# Patient Record
Sex: FEMALE | Race: BLACK OR AFRICAN AMERICAN | NOT HISPANIC OR LATINO | Employment: PART TIME | URBAN - METROPOLITAN AREA
[De-identification: names, ages, dates, MRNs, and addresses within clinical notes are randomized per-mention and may not be internally consistent; named-entity substitution may affect disease eponyms.]

---

## 2018-07-13 ENCOUNTER — HISTORICAL (OUTPATIENT)
Dept: ADMINISTRATIVE | Facility: HOSPITAL | Age: 28
End: 2018-07-13

## 2018-07-13 LAB
ABS NEUT (OLG): 2.65 X10(3)/MCL (ref 2.1–9.2)
APPEARANCE, UA: CLEAR
BACTERIA #/AREA URNS AUTO: ABNORMAL /[HPF]
BASOPHILS # BLD AUTO: 0.06 X10(3)/MCL
BASOPHILS NFR BLD AUTO: 1 %
BILIRUB UR QL STRIP: NEGATIVE
COLOR UR: ABNORMAL
EOSINOPHIL # BLD AUTO: 0.48 X10(3)/MCL
EOSINOPHIL NFR BLD AUTO: 8 %
ERYTHROCYTE [DISTWIDTH] IN BLOOD BY AUTOMATED COUNT: 12.4 % (ref 11.5–14.5)
GLUCOSE (UA): NORMAL
HCT VFR BLD AUTO: 39.2 % (ref 35–46)
HGB BLD-MCNC: 12.9 GM/DL (ref 12–16)
HGB UR QL STRIP: NEGATIVE
HYALINE CASTS #/AREA URNS LPF: ABNORMAL /[LPF]
IMM GRANULOCYTES # BLD AUTO: 0.01 10*3/UL
IMM GRANULOCYTES NFR BLD AUTO: 0 %
KETONES UR QL STRIP: NEGATIVE
LEUKOCYTE ESTERASE UR QL STRIP: NEGATIVE
LYMPHOCYTES # BLD AUTO: 2.73 X10(3)/MCL
LYMPHOCYTES NFR BLD AUTO: 43 % (ref 13–40)
MCH RBC QN AUTO: 31.5 PG (ref 26–34)
MCHC RBC AUTO-ENTMCNC: 32.9 GM/DL (ref 31–37)
MCV RBC AUTO: 95.6 FL (ref 80–100)
MONOCYTES # BLD AUTO: 0.41 X10(3)/MCL
MONOCYTES NFR BLD AUTO: 6 % (ref 4–12)
NEUTROPHILS # BLD AUTO: 2.65 X10(3)/MCL
NEUTROPHILS NFR BLD AUTO: 42 X10(3)/MCL
NITRITE UR QL STRIP: NEGATIVE
PH UR STRIP: 6 [PH] (ref 4.5–8)
PLATELET # BLD AUTO: 264 X10(3)/MCL (ref 130–400)
PMV BLD AUTO: 11.7 FL (ref 7.4–10.4)
PROT UR QL STRIP: 20 MG/DL
RBC # BLD AUTO: 4.1 X10(6)/MCL (ref 4–5.2)
RBC #/AREA URNS AUTO: ABNORMAL /[HPF]
SP GR UR STRIP: 1.02 (ref 1–1.03)
SQUAMOUS #/AREA URNS LPF: ABNORMAL /[LPF]
TSH SERPL-ACNC: 1.58 MIU/L (ref 0.36–3.74)
UROBILINOGEN UR STRIP-ACNC: NORMAL
WBC # SPEC AUTO: 6.3 X10(3)/MCL (ref 4.5–11)
WBC #/AREA URNS AUTO: ABNORMAL /HPF

## 2020-01-23 ENCOUNTER — HISTORICAL (OUTPATIENT)
Dept: ADMINISTRATIVE | Facility: HOSPITAL | Age: 30
End: 2020-01-23

## 2020-01-23 LAB
HAV IGM SERPL QL IA: NONREACTIVE
HBV CORE IGM SERPL QL IA: NONREACTIVE
HBV SURFACE AG SERPL QL IA: NEGATIVE
HCV AB SERPL QL IA: NONREACTIVE
HIV 1+2 AB+HIV1 P24 AG SERPL QL IA: NONREACTIVE
POC BETA-HCG (QUAL): NEGATIVE
T PALLIDUM AB SER QL: NONREACTIVE

## 2020-01-24 LAB
HIGH RISK HPV 16 (PRECISION): NEGATIVE
HIGH RISK HPV 18/45 (PRECISION): NEGATIVE
HPV16+18+H RISK 12 DNA CVX-IMP: POSITIVE
PAP RECOMMENDATION EXT: ABNORMAL
PAP SMEAR: ABNORMAL

## 2020-03-06 LAB — POC BETA-HCG (QUAL): NEGATIVE

## 2020-07-22 ENCOUNTER — HISTORICAL (OUTPATIENT)
Dept: INTERNAL MEDICINE | Facility: CLINIC | Age: 30
End: 2020-07-22

## 2020-07-22 LAB
ABS NEUT (OLG): 3.16 X10(3)/MCL (ref 2.1–9.2)
ALBUMIN SERPL-MCNC: 3.2 GM/DL (ref 3.4–5)
ALBUMIN/GLOB SERPL: 0.7 RATIO (ref 1.1–2)
ALP SERPL-CCNC: 73 UNIT/L (ref 45–117)
ALT SERPL-CCNC: 17 UNIT/L (ref 12–78)
APPEARANCE, UA: ABNORMAL
AST SERPL-CCNC: 11 UNIT/L (ref 15–37)
BACTERIA #/AREA URNS AUTO: ABNORMAL /HPF
BASOPHILS # BLD AUTO: 0 X10(3)/MCL (ref 0–0.2)
BASOPHILS NFR BLD AUTO: 1 %
BILIRUB SERPL-MCNC: 0.5 MG/DL (ref 0.2–1)
BILIRUB UR QL STRIP: NEGATIVE
BILIRUBIN DIRECT+TOT PNL SERPL-MCNC: 0.2 MG/DL (ref 0–0.2)
BILIRUBIN DIRECT+TOT PNL SERPL-MCNC: 0.3 MG/DL
BUN SERPL-MCNC: 13 MG/DL (ref 7–18)
CALCIUM SERPL-MCNC: 8.5 MG/DL (ref 8.5–10.1)
CHLORIDE SERPL-SCNC: 110 MMOL/L (ref 98–107)
CHOLEST SERPL-MCNC: 200 MG/DL
CHOLEST/HDLC SERPL: 3.8 {RATIO} (ref 0–4.4)
CO2 SERPL-SCNC: 27 MMOL/L (ref 21–32)
COLOR UR: ABNORMAL
CREAT SERPL-MCNC: 0.9 MG/DL (ref 0.6–1.3)
EOSINOPHIL # BLD AUTO: 0.2 X10(3)/MCL (ref 0–0.9)
EOSINOPHIL NFR BLD AUTO: 3 %
ERYTHROCYTE [DISTWIDTH] IN BLOOD BY AUTOMATED COUNT: 13.3 % (ref 11.5–14.5)
EST. AVERAGE GLUCOSE BLD GHB EST-MCNC: 97 MG/DL
GLOBULIN SER-MCNC: 4.4 GM/ML (ref 2.3–3.5)
GLUCOSE (UA): NEGATIVE
GLUCOSE SERPL-MCNC: 117 MG/DL (ref 74–106)
HBA1C MFR BLD: 5 % (ref 4.2–6.3)
HCT VFR BLD AUTO: 34.5 % (ref 35–46)
HDLC SERPL-MCNC: 52 MG/DL (ref 40–59)
HGB BLD-MCNC: 10.7 GM/DL (ref 12–16)
HGB UR QL STRIP: 1 MG/DL
HYALINE CASTS #/AREA URNS LPF: ABNORMAL /LPF
IMM GRANULOCYTES # BLD AUTO: 0.01 10*3/UL
IMM GRANULOCYTES NFR BLD AUTO: 0 %
KETONES UR QL STRIP: NEGATIVE
LDLC SERPL CALC-MCNC: 130 MG/DL
LEUKOCYTE ESTERASE UR QL STRIP: 25 LEU/UL
LYMPHOCYTES # BLD AUTO: 2.1 X10(3)/MCL (ref 0.6–4.6)
LYMPHOCYTES NFR BLD AUTO: 35 %
MCH RBC QN AUTO: 30.2 PG (ref 26–34)
MCHC RBC AUTO-ENTMCNC: 31 GM/DL (ref 31–37)
MCV RBC AUTO: 97.5 FL (ref 80–100)
MONOCYTES # BLD AUTO: 0.4 X10(3)/MCL (ref 0.1–1.3)
MONOCYTES NFR BLD AUTO: 6 %
NEUTROPHILS # BLD AUTO: 3.16 X10(3)/MCL (ref 2.1–9.2)
NEUTROPHILS NFR BLD AUTO: 54 %
NITRITE UR QL STRIP: NEGATIVE
PH UR STRIP: 6.5 [PH] (ref 4.5–8)
PLATELET # BLD AUTO: 253 X10(3)/MCL (ref 130–400)
PMV BLD AUTO: 11.5 FL (ref 7.4–10.4)
POTASSIUM SERPL-SCNC: 3.4 MMOL/L (ref 3.5–5.1)
PROT SERPL-MCNC: 7.6 GM/DL (ref 6.4–8.2)
PROT UR QL STRIP: 70 MG/DL
RBC # BLD AUTO: 3.54 X10(6)/MCL (ref 4–5.2)
RBC #/AREA URNS AUTO: ABNORMAL /HPF
SODIUM SERPL-SCNC: 142 MMOL/L (ref 136–145)
SP GR UR STRIP: 1.02 (ref 1–1.03)
SQUAMOUS #/AREA URNS LPF: >100 /LPF
T4 FREE SERPL-MCNC: 0.87 NG/DL (ref 0.76–1.46)
TRIGL SERPL-MCNC: 90 MG/DL
TSH SERPL-ACNC: 0.8 MIU/L (ref 0.36–3.74)
UROBILINOGEN UR STRIP-ACNC: NORMAL
VLDLC SERPL CALC-MCNC: 18 MG/DL
WBC # SPEC AUTO: 5.9 X10(3)/MCL (ref 4.5–11)
WBC #/AREA URNS AUTO: ABNORMAL /HPF

## 2020-07-24 LAB — FINAL CULTURE: NORMAL

## 2020-12-08 ENCOUNTER — HISTORICAL (OUTPATIENT)
Dept: INTERNAL MEDICINE | Facility: CLINIC | Age: 30
End: 2020-12-08

## 2020-12-08 LAB
ABS NEUT (OLG): 3.42 X10(3)/MCL (ref 2.1–9.2)
APPEARANCE, UA: CLEAR
BACTERIA #/AREA URNS AUTO: ABNORMAL /HPF
BASOPHILS # BLD AUTO: 0 X10(3)/MCL (ref 0–0.2)
BASOPHILS NFR BLD AUTO: 1 %
BILIRUB UR QL STRIP: NEGATIVE
BUN SERPL-MCNC: 13 MG/DL (ref 7–18.7)
CALCIUM SERPL-MCNC: 8.8 MG/DL (ref 8.4–10.2)
CHLORIDE SERPL-SCNC: 108 MMOL/L (ref 98–107)
CHOLEST SERPL-MCNC: 188 MG/DL
CHOLEST/HDLC SERPL: 4 {RATIO} (ref 0–5)
CO2 SERPL-SCNC: 26 MMOL/L (ref 22–29)
COLOR UR: YELLOW
CREAT SERPL-MCNC: 0.92 MG/DL (ref 0.55–1.02)
CREAT/UREA NIT SERPL: 14
EOSINOPHIL # BLD AUTO: 0.1 X10(3)/MCL (ref 0–0.9)
EOSINOPHIL NFR BLD AUTO: 2 %
ERYTHROCYTE [DISTWIDTH] IN BLOOD BY AUTOMATED COUNT: 13.6 % (ref 11.5–14.5)
GLUCOSE (UA): NEGATIVE
GLUCOSE SERPL-MCNC: 103 MG/DL (ref 74–100)
HCT VFR BLD AUTO: 36.1 % (ref 35–46)
HDLC SERPL-MCNC: 47 MG/DL (ref 35–60)
HGB BLD-MCNC: 11.1 GM/DL (ref 12–16)
HGB UR QL STRIP: NEGATIVE
HYALINE CASTS #/AREA URNS LPF: ABNORMAL /LPF
IMM GRANULOCYTES # BLD AUTO: 0.01 10*3/UL
IMM GRANULOCYTES NFR BLD AUTO: 0 %
KETONES UR QL STRIP: NEGATIVE
LDLC SERPL CALC-MCNC: 123 MG/DL (ref 50–140)
LEUKOCYTE ESTERASE UR QL STRIP: NEGATIVE
LYMPHOCYTES # BLD AUTO: 2.4 X10(3)/MCL (ref 0.6–4.6)
LYMPHOCYTES NFR BLD AUTO: 38 %
MCH RBC QN AUTO: 30.8 PG (ref 26–34)
MCHC RBC AUTO-ENTMCNC: 30.7 GM/DL (ref 31–37)
MCV RBC AUTO: 100.3 FL (ref 80–100)
MONOCYTES # BLD AUTO: 0.4 X10(3)/MCL (ref 0.1–1.3)
MONOCYTES NFR BLD AUTO: 6 %
NEUTROPHILS # BLD AUTO: 3.42 X10(3)/MCL (ref 2.1–9.2)
NEUTROPHILS NFR BLD AUTO: 54 %
NITRITE UR QL STRIP: NEGATIVE
PH UR STRIP: 5.5 [PH] (ref 4.5–8)
PLATELET # BLD AUTO: 292 X10(3)/MCL (ref 130–400)
PMV BLD AUTO: 11.2 FL (ref 7.4–10.4)
POTASSIUM SERPL-SCNC: 3.9 MMOL/L (ref 3.5–5.1)
PROT UR QL STRIP: 10 MG/DL
RBC # BLD AUTO: 3.6 X10(6)/MCL (ref 4–5.2)
RBC #/AREA URNS AUTO: ABNORMAL /HPF
SODIUM SERPL-SCNC: 140 MMOL/L (ref 136–145)
SP GR UR STRIP: 1.03 (ref 1–1.03)
SQUAMOUS #/AREA URNS LPF: ABNORMAL /LPF
TRIGL SERPL-MCNC: 89 MG/DL (ref 37–140)
UROBILINOGEN UR STRIP-ACNC: NORMAL
VLDLC SERPL CALC-MCNC: 18 MG/DL
WBC # SPEC AUTO: 6.4 X10(3)/MCL (ref 4.5–11)
WBC #/AREA URNS AUTO: ABNORMAL /HPF

## 2020-12-10 LAB — FINAL CULTURE: NORMAL

## 2022-04-10 ENCOUNTER — HISTORICAL (OUTPATIENT)
Dept: ADMINISTRATIVE | Facility: HOSPITAL | Age: 32
End: 2022-04-10

## 2022-04-25 VITALS
HEIGHT: 65 IN | OXYGEN SATURATION: 99 % | DIASTOLIC BLOOD PRESSURE: 84 MMHG | WEIGHT: 289 LBS | SYSTOLIC BLOOD PRESSURE: 136 MMHG | BODY MASS INDEX: 48.15 KG/M2

## 2022-05-03 NOTE — HISTORICAL [facility] CERNER
This is a historical note converted from [employer]ju[employer]. Formatting and pictures may have been removed.  Please reference Cerjuan for original formatting and attached multimedia. Chief Complaint  annual  History of Present Illness  The patient? here for annual exam.?Last GYN exam was 2 years ago in [place]. Her?LMP was 20. Period last?7-8 days and changes pads every 2-3 hours for cleanliness. Pt is requesting to restart Depo to become amenorrheic as she was in the past on Depo. Denies history of abnormal paps. Denies breast or urinary complaints.?Denies pelvic pain, abnormal bleeding or discharge today. Admits discharge and odor 2-3 months ago. Pt c/o external groin itching, admits to use of scented soaps, bath bombs and hot baths. Admits to razor shaving, with hair bumps occasionally. Pt reports chlamydia in the past and desires STI screening today. Contraception consist of condoms. Denies tobacco use. Denies fly hx of breast, ovarian, uterine or colon cancer.  ?  Review of Systems  CONSTITUTIONAL:??No weight loss, fever, chills or fatigue.  BREAST: No lumps or masses or pain, no nipple discharge or inversion  GI:??No nausea, vomiting or?abdominal pain.  :??No dysuria, frequency or urgency.?No hematuria.  GYN: No abnormal discharge, itching, bleeding, pelvic pain.  NEUROLOGIC:??Alert and oriented, No confusion.  Physical Exam  Vitals & Measurements  T:?36.8? ?C (Oral)? HR:?78(Peripheral)? RR:?20? BP:?136/88? SpO2:?99%?  HT:?175?cm? WT:?124.3?kg? BMI:?40.59? LMP:?2020 00:00 CST?  GENERAL: Alert and oriented x3.?No apparent distress.  BREAST: No mass, tenderness or discharge.?  ABDOMEN: Soft, nontender.??  PELVIC:?  Labia: No erythema or lesions or indurations.  Vagina: pink, moist vaginal mucosa, no abnormal discharge.  Cervix: Pink, no cervical motion tenderness.  Uterus: Mobile, non-tender, 10 weeks.  Adnexa: Non-tender, no palpable masses.  INTEGUMENTARY:?Warm and dry. Hyperpigmented eros  groin.  NEUROLOGIC: She is alert and oriented x3.?  PSYCHIATRIC:?Cooperative, appropriate mood and affect.  Assessment/Plan  1.?Pap smear for cervical cancer screening?Z12.4  ?Pap today.  RTC 1 year.  Ordered:  1160F- Medication reconciliation completed during visit, Pap smear for cervical cancer screening  Routine screening for STI (sexually transmitted infection)  Morbid obesity  Need for HPV vaccine, 01/23/20 14:16:00 CST  Clinic Follow up, *Est. 01/23/21 3:00:00 CST, Order for future visit, Pap smear for cervical cancer screening, [facility]  Pathology Gyn Request, 01/23/20 14:16:00 CST, AP Specimen, Thin Prep Pap Cervical-Auto/man screen, Routine GYN/Pap, Cervical, Thin Prep with HPV Probe, Normal, 01/23/20, Previous Pap, 2018, None, Previous Pregnancy, Cervix Present, Routine, Collected, RT - Routine, Hold Unt...  Preventative Health Care Est 18-39 years [id number] PC, Pap smear for cervical cancer screening, 01/23/20 14:16:00 CST  ?  2.?Routine screening for STI (sexually transmitted infection)?Z11.3  ?wet prep and g/c  HIV, hep and syphilis  ?  Change soap to Dove unscented only, no hot baths. Use electric clippers instead of razors. Keep area clean and dry.  Ordered:  1160F- Medication reconciliation completed during visit, Pap smear for cervical cancer screening  Routine screening for STI (sexually transmitted infection)  Morbid obesity  Need for HPV vaccine, 01/23/20 14:16:00 CST  Chlamydia trach and N. gonorrhea PCR, Routine collect, Cervical, Order for future visit, 01/23/20 14:16:00 CST, Stop date 01/23/20 14:16:00 CST, Nurse collect, Routine screening for STI (sexually transmitted infection)  Hepatitis Panel [facility], Routine collect, 01/23/20 14:16:00 CST, Blood, Order for future visit, Stop date 01/23/20 14:16:00 CST, Lab Collect, Routine screening for STI (sexually transmitted infection), 01/23/20 14:16:00 CST  HIV 1 and 2, Now collect, 01/23/20 14:16:00 CST, Blood, Order for future visit,  Stop date 01/23/20 14:16:00 CST, Lab Collect, Routine screening for STI (sexually transmitted infection), 01/23/20 14:16:00 CST  Syphilis Antibody (with Reflex RPR), Routine collect, 01/23/20 14:16:00 CST, Blood, Order for future visit, Stop date 01/23/20 14:16:00 CST, Lab Collect, Routine screening for STI (sexually transmitted infection), 01/23/20 14:16:00 CST  Wet Prep Smear, Routine collect, Vaginal, Order for future visit, 01/23/20 14:16:00 CST, Stop date 01/23/20 14:16:00 CST, Nurse collect, Routine screening for STI (sexually transmitted infection), 01/23/20 14:16:00 CST  ?  3.?Contraception management?Z30.9  Contraception counseling: Patient counseled on all risks/benefits/alternatives of contraceptive options including IUD, Implant, Injections, Combined Oral Contraceptive Pills, Vaginal ring and Patch, Progesterone only pills, barrier methods and abstinence.  Discussed progesterone only options to include Depo and Mirena IUD or nonhormonal Paragard IUD. In review of U.S. Medical Eligibility Criteria for Contraceptive Use, category?2 for history of controlled HTN, advantages outweigh risks, however pt had NSTEMI in 2019, followed by cardiology.  Pt interested in restarting Depo, last Depo 3+ years. States she accepts risks associated with Depo use, however she will speak with cardiology at visit on 2/14/20. If cardiology ok with proceeding with hormones will start Depo. If not, pt will need to proceed with Paragard IUD or tubal ligation which will not stop/decrease cycles. Pt verbalized understanding.  ?  4.?Morbid obesity?E66.01  ?Exercise 3-5 times a week for 30-45 minutes per day.  Avoid soda, simple sugars, excessive rice, potatoes or bread (carbs). Limit fast foods and fried foods. Choose complex carbs in moderation (example: green vegetables, beans, oatmeal). Eat plenty of fresh fruits and vegetables with lean meats daily. Including fish and shrimp.  Portion control.  Ordered:  1160F- Medication  reconciliation completed during visit, Pap smear for cervical cancer screening  Routine screening for STI (sexually transmitted infection)  Morbid obesity  Need for HPV vaccine, 20 14:16:00 CST  ?  Orders:  Urine Pregnancy POC, 20 14:16:00 CST  Referrals  Clinic Follow up, *Est. 21 3:00:00 CST, Order for future visit, Pap smear for cervical cancer screening, [facility]   Problem List/Past Medical History  Ongoing  Asthma  Essential hypertension  Hypertension  Morbid obesity  Historical  Pregnant  Pregnant  Pregnant  Pregnant  Procedure/Surgical History   delivery only;   Medications  albuterol 2.5 mg/3 mL (0.083%) inhalation solution, 2.5 mg= 3 mL, INH, q6hr, PRN, 6 refills  albuterol 90 mcg/inh inhalation aerosol, 2 puff(s), INH, q4hr, PRN, 2 refills  amoxicillin-clavulanate 875 mg-125 mg oral tablet, 1 tab(s), Oral, BID  aspirin 81 mg oral tablet, 81 mg= 1 tab(s), Oral, Daily  Flonase 50 mcg/inh nasal spray, 1 spray(s), Nasal, BID, 6 refills  generic, See Instructions  human papillomavirus vaccine 9-valent intramuscular suspension, 0.5 mL, IM, Once-Unscheduled  hydrALAZINE 100 mg oral tablet, 100 mg= 1 tab(s), Oral, TID, 6 refills  hydroCHLOROthiazide 12.5 mg oral capsule, 12.5 mg= 1 cap(s), Oral, Daily  losartan 100 mg oral tablet, See Instructions, 6 refills  Norvasc 10 mg oral tablet, 10 mg= 1 tab(s), Oral, Daily  Norvasc 10 mg oral tablet, 10 mg= 1 tab(s), Oral, Daily, 2 refills  sertraline 50 mg oral tablet, 75 mg= 1.5 tab(s), Oral, At Bedtime, 6 refills  Allergies  No Known Allergies  Social History  Abuse/Neglect  No, No, 2020  No, 2019  No, 2019  Alcohol - Denies Alcohol Use, 2015  Never, 2018  Employment/School  Employed, Work/School description: sitter., 2018  Exercise  Exercise frequency: 1-2 times/week. Self assessment: Good condition. Exercise type: Walking., 01/15/2019  Home/Environment  Lives with Children. Living situation:  Home/Independent., 07/13/2018  Nutrition/Health  Type of diet: low salt. Regular, 01/15/2019  Sexual  Sexually active: Yes. Number of current partners 1. Sexual orientation: Straight or heterosexual. Gender Identity Identifies as female. Yes, 01/23/2020  Sexually active: No., 09/25/2019  Spiritual/Cultural  Gnosticism, 09/25/2019  Substance Use - Denies Substance Abuse, 12/07/2015  Never, 02/14/2018  Tobacco - Denies Tobacco Use, 12/07/2015  Former smoker, quit more than 30 days ago, N/A, 01/23/2020  Never (less than 100 in lifetime), Cigarettes, N/A, 11/25/2019  Never (less than 100 in lifetime), N/A, 09/25/2019  Former smoker, quit more than 30 days ago, No, 06/21/2019  Former smoker, quit more than 30 days ago, N/A, Stopped age 25 Years. Previous treatment: None. Household tobacco concerns: No., 03/18/2019  Family History  Diabetes mellitus: Mother, Grandfather and Grandmother.  Hypertension.: Mother.  Immunizations  Vaccine Date Status   influenza virus vaccine, inactivated 03/18/2019 Given   Health Maintenance  Health Maintenance  ???Pending?(in the next year)  ??? ??OverDue  ??? ? ? ?Asthma Management-Asthma Medication Prescribed due??and every?  ??? ? ? ?Coronary Artery Disease Maintenance-Antiplatelet Agent Prescribed due??and every?  ??? ? ? ?Diabetes Screening due??and every?  ??? ??Due?  ??? ? ? ?Alcohol Misuse Screening due??01/01/20??and every 1??year(s)  ??? ? ? ?Coronary Artery Disease Maintenance-Lipid Lowering Therapy due??01/23/20??and every?  ??? ??Refused?  ??? ? ? ?Tetanus Vaccine due??01/23/20??and every 10??year(s)  ??? ??Due In Future?  ??? ? ? ?Hypertension Management-BMP not due until??03/20/20??and every 1??year(s)  ??? ? ? ?ADL Screening not due until??09/25/20??and every 1??year(s)  ??? ? ? ?Hypertension Management-Education not due until??09/25/20??and every 1??year(s)  ??? ? ? ?Obesity Screening not due until??01/01/21??and every 1??year(s)  ??? ? ? ?Blood Pressure Screening not due  until??01/22/21??and every 1??year(s)  ??? ? ? ?Body Mass Index Check not due until??01/22/21??and every 1??year(s)  ??? ? ? ?Hypertension Management-Blood Pressure not due until??01/22/21??and every 1??year(s)  ???Satisfied?(in the past 1 year)  ??? ??Satisfied?  ??? ? ? ?ADL Screening on??09/25/19.??Satisfied by Meghan Pichardo LPN  ??? ? ? ?Alcohol Misuse Screening on??09/25/19.??Satisfied by Coco Mcneill NP  ??? ? ? ?Asthma Management-Asthma Medication Prescribed on??09/25/19.??Satisfied by Coco Mcneill NP  ??? ? ? ?Blood Pressure Screening on??01/23/20.??Satisfied by Cherry Rocha LPN  ??? ? ? ?Body Mass Index Check on??01/23/20.??Satisfied by Cherry Rocha LPN  ??? ? ? ?Coronary Artery Disease Maintenance-Antiplatelet Agent Prescribed on??09/25/19.??Satisfied by Coco Mcneill NP  ??? ? ? ?Depression Screening on??11/25/19.??Satisfied by Katina Finley LPN  ??? ? ? ?Diabetes Maintenance-Fasting Lipid Profile on??03/18/19.??Satisfied by Yonny Wen  ??? ? ? ?Diabetes Maintenance-HgbA1c on??03/18/19.??Satisfied by Yonny Wen  ??? ? ? ?Diabetes Screening on??03/21/19.??Satisfied by Irasema Wei  ??? ? ? ?Hypertension Management-Blood Pressure on??01/23/20.??Satisfied by Cherry Rocha LPN  ??? ? ? ?Hypertension Management-Education on??09/25/19.??Satisfied by Coco Mcneill NP??Reason: Expectation Satisfied Elsewhere  ??? ? ? ?Hypertension Management-BMP on??03/21/19.??Satisfied by Irasema Wei  ??? ? ? ?Influenza Vaccine on??03/18/19.??Satisfied by Kiersten Apodaca RN  ??? ? ? ?Obesity Screening on??01/23/20.??Satisfied by Josefina Cherry DICK  ??? ??Refused?  ??? ? ? ?Tetanus Vaccine on??09/25/19.??Recorded by Osito SYED, Coco SANTANA??Reason: Patient Refuses  ?

## 2022-05-03 NOTE — HISTORICAL [facility] CERNER
This is a historical note converted from [employer]ner. Formatting and pictures may have been removed.  Please reference [employer]ner for original formatting and attached multimedia. Chief Complaint  F/U  History of Present Illness  28 y/o female here for initial visit. Pt has hx Asthma, HTN. Pt informed nurse she recently had an  last month due to her situation and is having anxiety/ depression. Denies SI/HI.  Review of Systems  Constitutional: negative except as stated in HPI  Eye: negative except as stated in HPI  ENT: negative except as stated in HPI  Respiratory: negative except as stated in HPI  Cardiovascular: negative except as stated in HPI  Gastrointestinal: negative except as stated in HPI  Genitourinary: negative except as stated in HPI  Heme/Lymph: negative except as stated in HPI  Endocrine: negative except as stated in HPI  Immunologic: negative except as stated in HPI  Musculoskeletal: negative except as stated in HPI  Integumentary: negative except as stated in HPI  Neurologic: negative except as stated in HPI  ?   All Other ROS_ negative except as stated in HPI  ?  Physical Exam  Vitals & Measurements  T:?37.0? ?C (Oral)? HR:?77(Peripheral)? RR:?18? BP:?152/100?  HT:?160.02?cm? HT:?160.02?cm? WT:?68.1?kg? WT:?68.1?kg? BMI:?26.59?  General: Alert and oriented, No acute distress.  Eye: Pupils are equal, round and reactive to light, Extraocular movements are intact.  HENT: Normocephalic.  Neck: Supple, Non-tender, No carotid bruit, No lymphadenopathy.  Respiratory: Lungs are clear to auscultation, Respirations are non-labored, Breath sounds are equal, Symmetrical chest wall expansion.  Cardiovascular: Normal rate, Regular rhythm, No murmur.  Gastrointestinal: Soft, Non-tender, Non-distended, Normal bowel sounds.  Musculoskeletal: Normal range of motion.  Integumentary: Warm, Dry, Intact.  Neurologic: No focal deficits, Cranial Nerves II-XII are grossly intact.  Assessment/Plan  Abnormal CBC  ?Labs in 3  months.  Ordered:  CBC w/ Auto Diff, Routine collect, 07/13/18 10:24:00 CDT, Blood, Order for future visit, Stop date 07/13/18 10:24:00 CDT, Lab Collect, Abnormal CBC, 07/13/18 10:24:00 CDT  Clinic Follow up, *Est. 10/13/18 3:00:00 CDT, in 3 months with YAIR Mcneill, Order for future visit, Asthma  HTN (hypertension)  Well adult exam  Abnormal CBC, [facility]  Office/Outpatient Visit Level 4 New [id number] PC, Asthma  HTN (hypertension)  Well adult exam  Abnormal CBC  Anxiety and depression, [facility], 07/13/18 10:45:00 CDT  ?  Anxiety and depression  ?Denies SI/HI. RX Sertraline 50 mg Take 1/2 tab po Q hs X 1 week then 1 tab po Q hs. RTC in 1 month for re-eval.  Ordered:  sertraline, See Instructions, Take 1/2 tab po Q hs X 1 week then 1 tab po Q hs., # 30 tab(s), 3 Refill(s), Pharmacy: [facility] [id number]  Clinic Follow up, *Est. 08/13/18 3:00:00 CDT, in 1 months with YAIR Mcneill f/u eval depression/anxiety., Order for future visit, Anxiety and depression, [facility]  Office/Outpatient Visit Level 4 New [id number] PC, Asthma  HTN (hypertension)  Well adult exam  Abnormal CBC  Anxiety and depression, [facility], 07/13/18 10:45:00 CDT  ?  Asthma  ?Avoid triggers. CXR today. PFT in 2 weeks. Cont med as prescribed.  Ordered:  budesonide, 1 puff(s), INH, BID, # 1 EA, 6 Refill(s), Pharmacy: [facility] [id number]  Clinic Follow up, *Est. 10/13/18 3:00:00 CDT, in 3 months with YAIR Mcneill, Order for future visit, Asthma  HTN (hypertension)  Well adult exam  Abnormal CBC, [facility]  Complete Pulmonary Function Test, *Est. 07/27/18 3:00:00 CDT, Order for future visit, Asthma, [facility]  Office/Outpatient Visit Level 4 New [id number] PC, Asthma  HTN (hypertension)  Well adult exam  Abnormal CBC  Anxiety and depression, [facility], 07/13/18 10:45:00 CDT  XR Chest 2 Views, Routine, *Est. 07/13/18 3:00:00 CDT, Asthma, None, Ambulatory, Rad Type, Order for future visit,  Asthma, Not Scheduled, *Est. 18 3:00:00 CDT  ?  HTN (hypertension)  ?Low fat low salt diet and exercise. Pt c/o LE swelling that is getting worse during the day. D/C Amlodipine. RS losartan 25 mg 1 tab po Q hs. Will repeat BP check in 1 month.  Ordered:  Clinic Follow up, *Est. 10/13/18 3:00:00 CDT, in 3 months with YAIR Mcneill, Order for future visit, Asthma  HTN (hypertension)  Well adult exam  Abnormal CBC, [facility]  Office/Outpatient Visit Level 4 New [id number] PC, Asthma  HTN (hypertension)  Well adult exam  Abnormal CBC  Anxiety and depression, [facility], 18 10:45:00 CDT  Urinalysis with Microscopic if Indicated, Routine collect, Urine, Order for future visit, 18 10:24:00 CDT, Stop date 18 10:24:00 CDT, Nurse collect, HTN (hypertension), 18 10:24:00 CDT  ?  Well adult exam  ?Labs in 3 months. Pt had pap smear done 18 at time of . Pt refused tetanus vaccine.  Ordered:  Clinic Follow up, *Est. 10/13/18 3:00:00 CDT, in 3 months with YAIR Mcneill, Order for future visit, Asthma  HTN (hypertension)  Well adult exam  Abnormal CBC, [facility]  Hemoglobin A1C [facility], Routine collect, *Est. 18 3:00:00 CDT, Blood, Order for future visit, *Est. Stop date 18 3:00:00 CDT, Lab Collect, Well adult exam, 18 10:24:00 CDT  Office/Outpatient Visit Level 4 New [id number] PC, Asthma  HTN (hypertension)  Well adult exam  Abnormal CBC  Anxiety and depression, [facility], 18 10:45:00 CDT  Thyroid Stimulating Hormone, Routine collect, 18 10:24:00 CDT, Blood, Order for future visit, Stop date 18 10:24:00 CDT, Lab Collect, Well adult exam, 18 10:24:00 CDT  ?  Orders:  albuterol, 2 puff(s), INH, q4hr, PRN PRN as needed for wheezing, # 8 gm, 2 Refill(s), Pharmacy: [facility] [id number]  albuterol, 2.5 mg = 3 mL, INH, q6hr, PRN PRN as needed for wheezing, # 100 EA, 6 Refill(s), [id number]  fluticasone nasal, 1  spray(s), Nasal, BID, # 16 gm, 6 Refill(s), [facility]acy: [id number]  losartan, 25 mg = 1 tab(s), Oral, Daily, # 30 tab(s), 11 Refill(s), Pharmacy: [id number]  montelukast, 10 mg = 1 tab(s), Oral, Daily, # 30 tab(s), 11 Refill(s), Pharmacy: [id number]  RTC in 1 month for re-eval of anxiety and depression and BP check.  RTC in 3 months with labs 1 week prior to appt.   Problem List/Past Medical History  Ongoing  Asthma  Diabetes  Essential hypertension  Hypertension  Historical  No qualifying data  Procedure/Surgical History   delivery only;.  Medications  albuterol 2.5 mg/3 mL (0.083%) inhalation solution, 2.5 mg= 3 mL, INH, q6hr, PRN, 6 refills  albuterol 90 mcg/inh inhalation aerosol, 2 puff(s), INH, q4hr, PRN, 2 refills  Flonase 50 mcg/inh nasal spray, 1 spray(s), Nasal, BID, 6 refills  losartan 25 mg oral tablet, 25 mg= 1 tab(s), Oral, Daily, 11 refills  MEDROXYPR AC INJ 150MG/ML  Pulmicort Flexhaler 180 mcg/inh inhalation powder, 1 puff(s), INH, BID, 6 refills  sertraline 50 mg oral tablet, See Instructions, 3 refills  Singulair 10 mg oral TABLET, 10 mg= 1 tab(s), Oral, Daily, 11 refills  Allergies  No Known Allergies  Social History  Alcohol - Denies Alcohol Use, 2015  Never, 2018  Employment/School  Employed, Work/School description: sitter., 2018  Home/Environment  Lives with Children. Living situation: Home/Independent., 2018  Substance Abuse - Denies Substance Abuse, 2015  Never, 2018  Tobacco - Denies Tobacco Use, 2015  Never smoker, 2018  Family History  Diabetes mellitus: Mother, Grandfather and Grandmother.  Hypertension.: Mother.  Health Maintenance  Health Maintenance  ???Pending?(in the next year)  ??? ??Due?  ??? ? ? ?Alcohol Misuse Screening due??18??and every 1??year(s)  ??? ? ? ?Cervical Cancer Screening due??18??Variable frequency  ??? ? ? ?Hypertension Management-Education  due??07/13/18??and every 1??year(s)  ??? ? ? ?Tetanus Vaccine due??07/13/18??and every 10??year(s)  ??? ??Due In Future?  ??? ? ? ?Influenza Vaccine not due until??10/02/18??and every 1??year(s)  ??? ? ? ?Hypertension Management-Blood Pressure not due until??10/24/18??and every 6??month(s)  ??? ? ? ?Smoking Cessation not due until??12/09/18??and every 1??year(s)  ??? ? ? ?Hypertension Management-BMP not due until??02/14/19??and every 1??year(s)  ??? ? ? ?Blood Pressure Screening not due until??04/24/19??and every 1??year(s)  ??? ? ? ?Body Mass Index Check not due until??04/24/19??and every 1??year(s)  ??? ? ? ?Depression Screening not due until??04/24/19??and every 1??year(s)  ??? ? ? ?Obesity Screening not due until??04/24/19??and every 1??year(s)  ??? ? ? ?Tobacco Use Screening not due until??04/24/19??and every 1??year(s)  ???Satisfied?(in the past 1 year)  ??? ??Satisfied?  ??? ? ? ?Blood Pressure Screening on??04/24/18.??Satisfied by Robby Mcadams LPN  ??? ? ? ?Body Mass Index Check on??04/24/18.??Satisfied by Robby Mcadams LPN  ??? ? ? ?Depression Screening on??04/24/18.??Satisfied by Robby Mcadams LPN  ??? ? ? ?Diabetes Screening on??02/14/18.??Satisfied by Ryann Fisher  ??? ? ? ?Hypertension Management-Blood Pressure on??04/24/18.??Satisfied by Robby Mcadams LPN  ??? ? ? ?Obesity Screening on??04/24/18.??Satisfied by Robby Mcadams LPN  ??? ? ? ?Smoking Cessation on??12/09/17.??Satisfied by Jarrod Martinez RN  ??? ? ? ?Tobacco Use Screening on??04/24/18.??Satisfied by Robby Mcadams LPN  ?  ?      Obesity: Encouraged low fat diet and exercise. Education provided.

## 2022-09-19 ENCOUNTER — HISTORICAL (OUTPATIENT)
Dept: ADMINISTRATIVE | Facility: HOSPITAL | Age: 32
End: 2022-09-19
Payer: MEDICAID

## 2023-03-04 ENCOUNTER — HOSPITAL ENCOUNTER (EMERGENCY)
Facility: HOSPITAL | Age: 33
Discharge: HOME | End: 2023-03-04
Attending: STUDENT IN AN ORGANIZED HEALTH CARE EDUCATION/TRAINING PROGRAM
Payer: MEDICAID

## 2023-03-04 VITALS
WEIGHT: 256.63 LBS | BODY MASS INDEX: 42.76 KG/M2 | TEMPERATURE: 98 F | SYSTOLIC BLOOD PRESSURE: 132 MMHG | RESPIRATION RATE: 19 BRPM | HEIGHT: 65 IN | HEART RATE: 75 BPM | DIASTOLIC BLOOD PRESSURE: 71 MMHG | OXYGEN SATURATION: 100 %

## 2023-03-04 DIAGNOSIS — K52.9 GASTROENTERITIS: Primary | ICD-10-CM

## 2023-03-04 DIAGNOSIS — B34.9 NONSPECIFIC SYNDROME SUGGESTIVE OF VIRAL ILLNESS: ICD-10-CM

## 2023-03-04 LAB
ALBUMIN SERPL-MCNC: 3.8 G/DL (ref 3.5–5)
ALBUMIN/GLOB SERPL: 0.9 RATIO (ref 1.1–2)
ALP SERPL-CCNC: 73 UNIT/L (ref 40–150)
ALT SERPL-CCNC: 13 UNIT/L (ref 0–55)
APPEARANCE UR: CLEAR
AST SERPL-CCNC: 17 UNIT/L (ref 5–34)
B-HCG UR QL: NEGATIVE
BACTERIA #/AREA URNS AUTO: ABNORMAL /HPF
BASOPHILS # BLD AUTO: 0.02 X10(3)/MCL (ref 0–0.2)
BASOPHILS NFR BLD AUTO: 0.2 %
BILIRUB UR QL STRIP.AUTO: NEGATIVE MG/DL
BILIRUBIN DIRECT+TOT PNL SERPL-MCNC: 1.2 MG/DL
BUN SERPL-MCNC: 10.8 MG/DL (ref 7–18.7)
CALCIUM SERPL-MCNC: 9.6 MG/DL (ref 8.4–10.2)
CHLORIDE SERPL-SCNC: 106 MMOL/L (ref 98–107)
CO2 SERPL-SCNC: 21 MMOL/L (ref 22–29)
COLOR UR AUTO: ABNORMAL
CREAT SERPL-MCNC: 0.91 MG/DL (ref 0.55–1.02)
CTP QC/QA: YES
EOSINOPHIL # BLD AUTO: 0.06 X10(3)/MCL (ref 0–0.9)
EOSINOPHIL NFR BLD AUTO: 0.5 %
ERYTHROCYTE [DISTWIDTH] IN BLOOD BY AUTOMATED COUNT: 13.6 % (ref 11.5–17)
FLUAV AG UPPER RESP QL IA.RAPID: NOT DETECTED
FLUBV AG UPPER RESP QL IA.RAPID: NOT DETECTED
GFR SERPLBLD CREATININE-BSD FMLA CKD-EPI: >60 MLS/MIN/1.73/M2
GLOBULIN SER-MCNC: 4.2 GM/DL (ref 2.4–3.5)
GLUCOSE SERPL-MCNC: 168 MG/DL (ref 74–100)
GLUCOSE UR QL STRIP.AUTO: NORMAL MG/DL
HCT VFR BLD AUTO: 38.4 % (ref 37–47)
HGB BLD-MCNC: 12.7 G/DL (ref 12–16)
HYALINE CASTS #/AREA URNS LPF: ABNORMAL /LPF
IMM GRANULOCYTES # BLD AUTO: 0.05 X10(3)/MCL (ref 0–0.04)
IMM GRANULOCYTES NFR BLD AUTO: 0.4 %
KETONES UR QL STRIP.AUTO: ABNORMAL MG/DL
LEUKOCYTE ESTERASE UR QL STRIP.AUTO: NEGATIVE UNIT/L
LIPASE SERPL-CCNC: 9 U/L
LYMPHOCYTES # BLD AUTO: 0.96 X10(3)/MCL (ref 0.6–4.6)
LYMPHOCYTES NFR BLD AUTO: 8.4 %
MCH RBC QN AUTO: 31 PG
MCHC RBC AUTO-ENTMCNC: 33.1 G/DL (ref 33–36)
MCV RBC AUTO: 93.7 FL (ref 80–94)
MONOCYTES # BLD AUTO: 0.27 X10(3)/MCL (ref 0.1–1.3)
MONOCYTES NFR BLD AUTO: 2.4 %
MUCOUS THREADS URNS QL MICRO: ABNORMAL /LPF
NEUTROPHILS # BLD AUTO: 10.09 X10(3)/MCL (ref 2.1–9.2)
NEUTROPHILS NFR BLD AUTO: 88.1 %
NITRITE UR QL STRIP.AUTO: NEGATIVE
NRBC BLD AUTO-RTO: 0 %
PH UR STRIP.AUTO: 8 [PH]
PLATELET # BLD AUTO: 307 X10(3)/MCL (ref 130–400)
PMV BLD AUTO: 11 FL (ref 7.4–10.4)
POTASSIUM SERPL-SCNC: 3.4 MMOL/L (ref 3.5–5.1)
PROT SERPL-MCNC: 8 GM/DL (ref 6.4–8.3)
PROT UR QL STRIP.AUTO: ABNORMAL MG/DL
RBC # BLD AUTO: 4.1 X10(6)/MCL (ref 4.2–5.4)
RBC #/AREA URNS AUTO: ABNORMAL /HPF
RBC UR QL AUTO: NEGATIVE UNIT/L
RSV A 5' UTR RNA NPH QL NAA+PROBE: NOT DETECTED
SARS-COV-2 RNA RESP QL NAA+PROBE: NOT DETECTED
SODIUM SERPL-SCNC: 140 MMOL/L (ref 136–145)
SP GR UR STRIP.AUTO: 1.02
SQUAMOUS #/AREA URNS LPF: ABNORMAL /HPF
UROBILINOGEN UR STRIP-ACNC: NORMAL MG/DL
WBC # SPEC AUTO: 11.5 X10(3)/MCL (ref 4.5–11.5)
WBC #/AREA URNS AUTO: ABNORMAL /HPF

## 2023-03-04 PROCEDURE — 85025 COMPLETE CBC W/AUTO DIFF WBC: CPT | Performed by: STUDENT IN AN ORGANIZED HEALTH CARE EDUCATION/TRAINING PROGRAM

## 2023-03-04 PROCEDURE — 96375 TX/PRO/DX INJ NEW DRUG ADDON: CPT

## 2023-03-04 PROCEDURE — 25000003 PHARM REV CODE 250: Performed by: STUDENT IN AN ORGANIZED HEALTH CARE EDUCATION/TRAINING PROGRAM

## 2023-03-04 PROCEDURE — 99284 EMERGENCY DEPT VISIT MOD MDM: CPT | Mod: 25

## 2023-03-04 PROCEDURE — 0241U COVID/RSV/FLU A&B PCR: CPT | Performed by: STUDENT IN AN ORGANIZED HEALTH CARE EDUCATION/TRAINING PROGRAM

## 2023-03-04 PROCEDURE — 96361 HYDRATE IV INFUSION ADD-ON: CPT

## 2023-03-04 PROCEDURE — 63600175 PHARM REV CODE 636 W HCPCS: Performed by: STUDENT IN AN ORGANIZED HEALTH CARE EDUCATION/TRAINING PROGRAM

## 2023-03-04 PROCEDURE — 81001 URINALYSIS AUTO W/SCOPE: CPT | Performed by: STUDENT IN AN ORGANIZED HEALTH CARE EDUCATION/TRAINING PROGRAM

## 2023-03-04 PROCEDURE — 96374 THER/PROPH/DIAG INJ IV PUSH: CPT

## 2023-03-04 PROCEDURE — 81025 URINE PREGNANCY TEST: CPT | Performed by: STUDENT IN AN ORGANIZED HEALTH CARE EDUCATION/TRAINING PROGRAM

## 2023-03-04 PROCEDURE — 80053 COMPREHEN METABOLIC PANEL: CPT | Performed by: STUDENT IN AN ORGANIZED HEALTH CARE EDUCATION/TRAINING PROGRAM

## 2023-03-04 PROCEDURE — 83690 ASSAY OF LIPASE: CPT | Performed by: STUDENT IN AN ORGANIZED HEALTH CARE EDUCATION/TRAINING PROGRAM

## 2023-03-04 RX ORDER — ACETAMINOPHEN 500 MG
1000 TABLET ORAL
Status: COMPLETED | OUTPATIENT
Start: 2023-03-04 | End: 2023-03-04

## 2023-03-04 RX ORDER — HYDROCHLOROTHIAZIDE 25 MG/1
25 TABLET ORAL
COMMUNITY
Start: 2023-02-01 | End: 2024-02-20

## 2023-03-04 RX ORDER — LOPERAMIDE HYDROCHLORIDE 2 MG/1
2 CAPSULE ORAL 4 TIMES DAILY PRN
Qty: 12 CAPSULE | Refills: 0 | Status: SHIPPED | OUTPATIENT
Start: 2023-03-04 | End: 2023-03-14

## 2023-03-04 RX ORDER — ALBUTEROL SULFATE 90 UG/1
2 AEROSOL, METERED RESPIRATORY (INHALATION) EVERY 6 HOURS PRN
COMMUNITY
Start: 2023-01-13 | End: 2024-01-10 | Stop reason: SDUPTHER

## 2023-03-04 RX ORDER — ONDANSETRON 2 MG/ML
4 INJECTION INTRAMUSCULAR; INTRAVENOUS
Status: COMPLETED | OUTPATIENT
Start: 2023-03-04 | End: 2023-03-04

## 2023-03-04 RX ORDER — LOSARTAN POTASSIUM 100 MG/1
100 TABLET ORAL
COMMUNITY
Start: 2023-01-13 | End: 2024-02-20 | Stop reason: SDUPTHER

## 2023-03-04 RX ORDER — KETOROLAC TROMETHAMINE 30 MG/ML
15 INJECTION, SOLUTION INTRAMUSCULAR; INTRAVENOUS
Status: COMPLETED | OUTPATIENT
Start: 2023-03-04 | End: 2023-03-04

## 2023-03-04 RX ORDER — SERTRALINE HYDROCHLORIDE 50 MG/1
50 TABLET, FILM COATED ORAL NIGHTLY
COMMUNITY
Start: 2023-01-13 | End: 2024-01-10 | Stop reason: SDUPTHER

## 2023-03-04 RX ORDER — ERGOCALCIFEROL 1.25 MG/1
CAPSULE ORAL
COMMUNITY
Start: 2023-01-13

## 2023-03-04 RX ORDER — FERROUS SULFATE TAB 325 MG (65 MG ELEMENTAL FE) 325 (65 FE) MG
TAB ORAL
COMMUNITY
Start: 2023-03-02

## 2023-03-04 RX ORDER — ATORVASTATIN CALCIUM 20 MG/1
20 TABLET, FILM COATED ORAL NIGHTLY
COMMUNITY
Start: 2022-09-28 | End: 2024-02-20 | Stop reason: SDUPTHER

## 2023-03-04 RX ORDER — ONDANSETRON 4 MG/1
4 TABLET, ORALLY DISINTEGRATING ORAL EVERY 6 HOURS PRN
Qty: 14 TABLET | Refills: 0 | Status: SHIPPED | OUTPATIENT
Start: 2023-03-04

## 2023-03-04 RX ORDER — CARVEDILOL 6.25 MG/1
6.25 TABLET ORAL 2 TIMES DAILY
COMMUNITY
Start: 2023-03-02 | End: 2024-01-10

## 2023-03-04 RX ORDER — HYDRALAZINE HYDROCHLORIDE 100 MG/1
100 TABLET, FILM COATED ORAL 3 TIMES DAILY
COMMUNITY
Start: 2022-09-28 | End: 2024-02-20 | Stop reason: SDUPTHER

## 2023-03-04 RX ORDER — AMLODIPINE BESYLATE 10 MG/1
10 TABLET ORAL
COMMUNITY
Start: 2023-01-13 | End: 2024-02-20 | Stop reason: SDUPTHER

## 2023-03-04 RX ADMIN — KETOROLAC TROMETHAMINE 15 MG: 30 INJECTION, SOLUTION INTRAMUSCULAR; INTRAVENOUS at 12:03

## 2023-03-04 RX ADMIN — SODIUM CHLORIDE 1000 ML: 9 INJECTION, SOLUTION INTRAVENOUS at 12:03

## 2023-03-04 RX ADMIN — ONDANSETRON 4 MG: 2 INJECTION INTRAMUSCULAR; INTRAVENOUS at 12:03

## 2023-03-04 RX ADMIN — ACETAMINOPHEN 1000 MG: 500 TABLET ORAL at 11:03

## 2023-03-04 NOTE — ED PROVIDER NOTES
Encounter Date: 3/4/2023       History     Chief Complaint   Patient presents with    Abdominal Pain    Vomiting    Diarrhea     C/o vomiting, diarrhea, abdominal pain since this am.      32-year-old female presents to ED for abdominal discomfort with nausea vomiting diarrhea for 1 day.  States the discomfort is generalized.  Not associated with eating.  Has had decreased appetite.  No flank pain, no changes in urination or stool.  Denies pregnancy.  Multiple questionable sick contacts.  Has no other complaints or concerns at this time.    Review of patient's allergies indicates:  No Known Allergies  Past Medical History:   Diagnosis Date    Maternal diabetes mellitus, antepartum 9/29/2016 3:22:36 PM    [facility] Historical - Other: Gestational diabetes mellitus (GDM) affecting pregnancy, antepartum-No Additional Notes    Maternal diabetes mellitus, antepartum 9/29/2016 3:22:36 PM    [facility] Historical - Other: Gestational diabetes mellitus (GDM) affecting pregnancy, antepartum-No Additional Notes     History reviewed. No pertinent surgical history.  History reviewed. No pertinent family history.  Social History     Tobacco Use    Smoking status: Never   Substance Use Topics    Alcohol use: Never    Drug use: Yes     Types: Marijuana     Comment: daily     Review of Systems   Constitutional:  Positive for chills and fever. Negative for diaphoresis.   HENT:  Negative for congestion, rhinorrhea, sinus pain and sore throat.    Eyes:  Negative for pain, discharge and itching.   Respiratory:  Negative for cough, chest tightness and shortness of breath.    Cardiovascular:  Negative for chest pain and palpitations.   Gastrointestinal:  Positive for abdominal pain, diarrhea, nausea and vomiting. Negative for abdominal distention, anal bleeding, blood in stool, constipation and rectal pain.   Genitourinary:  Negative for dysuria, flank pain and hematuria.   Musculoskeletal:  Negative for back pain and myalgias.    Skin:  Negative for color change and rash.   Neurological:  Negative for dizziness, weakness and headaches.   Psychiatric/Behavioral:  Negative for confusion. The patient is not hyperactive.      Physical Exam     Initial Vitals [03/04/23 1054]   BP Pulse Resp Temp SpO2   (!) 146/90 60 20 97.7 °F (36.5 °C) 100 %      MAP       --         Physical Exam    Vitals reviewed.  Constitutional: She appears well-developed and well-nourished. She is not diaphoretic. No distress.   Patient in no acute distress.  Just returned from the bathroom after vomiting.   HENT:   Head: Normocephalic and atraumatic.   Eyes: Conjunctivae and EOM are normal. Pupils are equal, round, and reactive to light.   Neck: Neck supple. No tracheal deviation present.   Normal range of motion.  Cardiovascular:  Normal rate, regular rhythm, normal heart sounds and intact distal pulses.           Pulmonary/Chest: Breath sounds normal. No respiratory distress.   Abdominal: Abdomen is soft. There is abdominal tenderness in the right upper quadrant and right lower quadrant.   Abdomen is mildly tender on the right side.  negative Solano's or McBurney's sign.   No right CVA tenderness.  No left CVA tenderness. There is no rebound, no guarding, no tenderness at McBurney's point and negative Solano's sign. negative Rovsing's sign  Musculoskeletal:         General: Normal range of motion.      Cervical back: Normal range of motion and neck supple.     Neurological: She is alert and oriented to person, place, and time. She has normal strength. GCS score is 15. GCS eye subscore is 4. GCS verbal subscore is 5. GCS motor subscore is 6.   Skin: Skin is warm and dry. Capillary refill takes less than 2 seconds. No rash noted.   Psychiatric: She has a normal mood and affect. Her behavior is normal. Judgment and thought content normal.       ED Course   Procedures  Labs Reviewed   COMPREHENSIVE METABOLIC PANEL - Abnormal; Notable for the following components:        Result Value    Potassium Level 3.4 (*)     Carbon Dioxide 21 (*)     Glucose Level 168 (*)     Globulin 4.2 (*)     Albumin/Globulin Ratio 0.9 (*)     All other components within normal limits   URINALYSIS, REFLEX TO URINE CULTURE - Abnormal; Notable for the following components:    Protein, UA Trace (*)     Ketones, UA 2+ (*)     Bacteria, UA Trace (*)     Squamous Epithelial Cells, UA Occ (*)     Mucous, UA Trace (*)     All other components within normal limits   CBC WITH DIFFERENTIAL - Abnormal; Notable for the following components:    RBC 4.10 (*)     MPV 11.0 (*)     Neut # 10.09 (*)     IG# 0.05 (*)     All other components within normal limits   LIPASE - Normal   COVID/RSV/FLU A&B PCR - Normal    Narrative:     The Xpert Xpress SARS-CoV-2/FLU/RSV plus is a rapid, multiplexed real-time PCR test intended for the simultaneous qualitative detection and differentiation of SARS-CoV-2, Influenza A, Influenza B, and respiratory syncytial virus (RSV) viral RNA in either nasopharyngeal swab or nasal swab specimens.         CBC W/ AUTO DIFFERENTIAL    Narrative:     The following orders were created for panel order CBC auto differential.  Procedure                               Abnormality         Status                     ---------                               -----------         ------                     CBC with Differential[[id number]]        Abnormal            Final result                 Please view results for these tests on the individual orders.   POCT URINE PREGNANCY          Imaging Results    None          Medications   sodium chloride 0.9% bolus 1,000 mL 1,000 mL ( Intravenous Restarted 3/4/23 1309)   ondansetron injection 4 mg (4 mg Intravenous Given 3/4/23 1200)   acetaminophen tablet 1,000 mg (1,000 mg Oral Given 3/4/23 1159)   ketorolac injection 15 mg (15 mg Intravenous Given 3/4/23 1208)     Medical Decision Making:   History:   Old Medical Records: I decided to obtain old medical  records.  Initial Assessment:   Abdominal discomfort with nausea vomiting diarrhea  Differential Diagnosis:   Gastroenteritis  Cholecystitis  Viral syndrome  COVID  Influenza  Food poisoning  Diverticulitis  SBO  Clinical Tests:   Lab Tests: Reviewed and Ordered  ED Management:  Patient initially had nausea and vomiting.  Abdominal exam demonstrated mild right-sided generalized abdominal discomfort without rebound guarding or rigidity.  IV initiated and provided fluids, antiemetics and pain medication after which patient states symptoms completely resolved.  States she felt great.  Pt sleeping comfortably in the room.  Abdomen nontender moving forward.  Laboratory analysis was grossly unremarkable with a negative viral panel, normal liver and pancreatic enzymes, noninfective urine, electrolytes grossly unremarkable.  Patient at this time can be treated symptomatically and supportively.  Will provide antiemetics and diarrheal medication and is to follow up closely in outpatient setting. Symptoms consistent clinically and through labs with viral syndrome.  Strict return precautions provided and patient discharged stable.  Voiced understanding. (Jemma)            ED Course as of 03/04/23 1800   Sat Mar 04, 2023   1147 Patient vomited in department.  Will start IV and give antiemetics with fluids. [RZ]      ED Course User Index  [RZ] Rico Easton MD                 Clinical Impression:   Final diagnoses:  [K52.9] Gastroenteritis (Primary)  [B34.9] Nonspecific syndrome suggestive of viral illness        ED Disposition Condition    Discharge Stable          ED Prescriptions       Medication Sig Dispense Start Date End Date Auth. Provider    ondansetron (ZOFRAN-ODT) 4 MG TbDL Take 1 tablet (4 mg total) by mouth every 6 (six) hours as needed (nausea). 14 tablet 3/4/2023 -- Rico Easton MD    loperamide (IMODIUM) 2 mg capsule Take 1 capsule (2 mg total) by mouth 4 (four) times daily as needed for Diarrhea. 12 capsule  3/4/2023 3/14/2023 Rico Easton MD          Follow-up Information       Follow up With Specialties Details Why Contact Info    Carmelakeith Moralez MD Internal Medicine Schedule an appointment as soon as possible for a visit in 3 days  [address] [place] [id number]  [phone]      [facility] Emergency Medicine  As needed, If symptoms worsen [address] [place] [address] [phone]             Rico Easton MD  03/04/23 1597

## 2023-03-04 NOTE — Clinical Note
"Elian"Sherry Mann was seen and treated in our emergency department on 3/4/2023.  She may return to work on 03/06/2023.       If you have any questions or concerns, please don't hesitate to call.      Rico Easton MD"

## 2023-04-10 ENCOUNTER — HOSPITAL ENCOUNTER (EMERGENCY)
Facility: HOSPITAL | Age: 33
Discharge: HOME | End: 2023-04-10
Attending: FAMILY MEDICINE
Payer: MEDICAID

## 2023-04-10 VITALS
SYSTOLIC BLOOD PRESSURE: 177 MMHG | DIASTOLIC BLOOD PRESSURE: 106 MMHG | WEIGHT: 251.31 LBS | RESPIRATION RATE: 16 BRPM | BODY MASS INDEX: 41.87 KG/M2 | TEMPERATURE: 99 F | OXYGEN SATURATION: 100 % | HEART RATE: 74 BPM | HEIGHT: 65 IN

## 2023-04-10 DIAGNOSIS — K02.9 PAIN DUE TO DENTAL CARIES: Primary | ICD-10-CM

## 2023-04-10 LAB
B-HCG UR QL: NEGATIVE
CTP QC/QA: YES

## 2023-04-10 PROCEDURE — 99284 EMERGENCY DEPT VISIT MOD MDM: CPT

## 2023-04-10 PROCEDURE — 81025 URINE PREGNANCY TEST: CPT | Performed by: PHYSICIAN ASSISTANT

## 2023-04-10 PROCEDURE — 96372 THER/PROPH/DIAG INJ SC/IM: CPT | Performed by: PHYSICIAN ASSISTANT

## 2023-04-10 PROCEDURE — 63600175 PHARM REV CODE 636 W HCPCS: Performed by: PHYSICIAN ASSISTANT

## 2023-04-10 RX ORDER — KETOROLAC TROMETHAMINE 10 MG/1
10 TABLET, FILM COATED ORAL EVERY 6 HOURS PRN
Qty: 20 TABLET | Refills: 0 | Status: SHIPPED | OUTPATIENT
Start: 2023-04-10 | End: 2023-04-15

## 2023-04-10 RX ORDER — AMOXICILLIN AND CLAVULANATE POTASSIUM 875; 125 MG/1; MG/1
1 TABLET, FILM COATED ORAL 2 TIMES DAILY
Qty: 14 TABLET | Refills: 0 | OUTPATIENT
Start: 2023-04-10 | End: 2023-12-29

## 2023-04-10 RX ORDER — CHLORHEXIDINE GLUCONATE ORAL RINSE 1.2 MG/ML
15 SOLUTION DENTAL 2 TIMES DAILY
Qty: 300 ML | Refills: 0 | Status: SHIPPED | OUTPATIENT
Start: 2023-04-10 | End: 2023-04-20

## 2023-04-10 RX ORDER — KETOROLAC TROMETHAMINE 30 MG/ML
30 INJECTION, SOLUTION INTRAMUSCULAR; INTRAVENOUS
Status: COMPLETED | OUTPATIENT
Start: 2023-04-10 | End: 2023-04-10

## 2023-04-10 RX ADMIN — KETOROLAC TROMETHAMINE 30 MG: 30 INJECTION, SOLUTION INTRAMUSCULAR; INTRAVENOUS at 11:04

## 2023-04-10 NOTE — DISCHARGE INSTRUCTIONS
It is important that you follow up with your dentist for further evaluation, workup, and treatment as necessary. The exam and treatment you received in Emergency Department was for an urgent problem and NOT INTENDED AS COMPLETE CARE. It is important that you FOLLOW UP with a doctor for ongoing care. If your symptoms become WORSE or you DO NOT IMPROVE and you are unable to reach your health care provider, you should RETURN to the Emergency Department.

## 2023-04-10 NOTE — ED PROVIDER NOTES
Encounter Date: 4/10/2023       History     Chief Complaint   Patient presents with    Dental Pain     Left lower dental pain x 2 days     Elian Mann is a 32 y.o. female with a history of HTN and asthma who presents to the ED complaining of dental pain. Pain has been present for the last 2 days after patient cracked her lower back molar. She has an appointment with her dentist in June, they could not get her scheduled any earlier. She did not take her BP meds today. She denies fevers, chills, sore throat, chest pain, SOB, headache. Did not take BP meds yet today.     The history is provided by the patient. No  was used.   Review of patient's allergies indicates:  No Known Allergies  Past Medical History:   Diagnosis Date    Asthma     Hypertension      History reviewed. No pertinent surgical history.  History reviewed. No pertinent family history.  Social History     Tobacco Use    Smoking status: Never    Smokeless tobacco: Never   Substance Use Topics    Alcohol use: Never    Drug use: Yes     Types: Marijuana     Comment: daily     Review of Systems   Constitutional:  Negative for chills and fever.   HENT:  Negative for congestion and sore throat.    Eyes:  Negative for redness and itching.   Respiratory:  Negative for cough and shortness of breath.    Cardiovascular:  Negative for chest pain and leg swelling.   Gastrointestinal:  Negative for abdominal pain and nausea.   Genitourinary:  Negative for dysuria and frequency.   Musculoskeletal:  Negative for arthralgias and back pain.   Skin:  Negative for color change and rash.   Neurological:  Negative for dizziness and weakness.   Hematological:  Does not bruise/bleed easily.     Physical Exam     Initial Vitals [04/10/23 1140]   BP Pulse Resp Temp SpO2   (!) 177/106 74 16 98.5 °F (36.9 °C) 100 %      MAP       --         Physical Exam    Nursing note and vitals reviewed.  Constitutional: She appears well-developed and well-nourished.  No distress.   HENT:   Head: Normocephalic and atraumatic.   Mouth/Throat: Dental caries present. No dental abscesses. No oropharyngeal exudate.       Eyes: EOM are normal. No scleral icterus.   Neck: Neck supple.   Normal range of motion.  Cardiovascular:  Normal rate and regular rhythm.           No murmur heard.  Pulmonary/Chest: Breath sounds normal. No respiratory distress. She has no wheezes.   Abdominal: Abdomen is soft. Bowel sounds are normal. She exhibits no distension. There is no abdominal tenderness.   Musculoskeletal:         General: No tenderness. Normal range of motion.      Cervical back: Normal range of motion and neck supple.     Neurological: She is alert and oriented to person, place, and time. No cranial nerve deficit.   Skin: Skin is warm and dry. Capillary refill takes less than 2 seconds. No erythema.   Psychiatric: She has a normal mood and affect. Her behavior is normal. Judgment and thought content normal.       ED Course   Procedures  Labs Reviewed   POCT URINE PREGNANCY          Imaging Results    None          Medications   ketorolac injection 30 mg (30 mg Intramuscular Given 4/10/23 1156)     Medical Decision Making:   Initial Assessment:   Resting comfortably in NAD. Hypertensive at 177/106, reports has not yet taken BP meds today.  Differential Diagnosis:   Dental pain, dental caries, tooth abscess  ED Management:  Provided patient with list of dentists so that she can try to find one who will see her earlier than her June appointment. Will cover with augmentin x 7 days due to suspicion for early infection/abscess formation. ED return precautions given. Pt verbalized understanding. All questions answered.                         Clinical Impression:   Final diagnoses:  [K02.9] Pain due to dental caries (Primary)        ED Disposition Condition    Discharge Stable          ED Prescriptions       Medication Sig Dispense Start Date End Date Auth. Provider    amoxicillin-clavulanate  875-125mg (AUGMENTIN) 875-125 mg per tablet Take 1 tablet by mouth 2 (two) times daily. 14 tablet 4/10/2023 -- Nury Meyer PA-C    chlorhexidine (PERIDEX) 0.12 % solution Use as directed 15 mLs in the mouth or throat 2 (two) times daily. for 10 days 300 mL 4/10/2023 4/20/2023 Nury Meyer PA-C    ketorolac (TORADOL) 10 mg tablet Take 1 tablet (10 mg total) by mouth every 6 (six) hours as needed for Pain. 20 tablet 4/10/2023 4/15/2023 Nury Meyer PA-C          Follow-up Information       Follow up With Specialties Details Why Contact Info    [facility] Emergency Medicine  If symptoms worsen [address] [facility] [address] [phone]    dentist  Schedule an appointment as soon as possible for a visit                Nury Meyer PA-C  04/10/23 4613

## 2023-12-29 ENCOUNTER — HOSPITAL ENCOUNTER (EMERGENCY)
Facility: HOSPITAL | Age: 33
Discharge: HOME | End: 2023-12-29
Attending: INTERNAL MEDICINE
Payer: MEDICAID

## 2023-12-29 VITALS
RESPIRATION RATE: 16 BRPM | SYSTOLIC BLOOD PRESSURE: 152 MMHG | OXYGEN SATURATION: 100 % | BODY MASS INDEX: 42.19 KG/M2 | DIASTOLIC BLOOD PRESSURE: 92 MMHG | HEART RATE: 70 BPM | TEMPERATURE: 98 F | WEIGHT: 253.5 LBS

## 2023-12-29 DIAGNOSIS — K08.89 PAIN, DENTAL: Primary | ICD-10-CM

## 2023-12-29 DIAGNOSIS — K04.7 DENTAL ABSCESS: ICD-10-CM

## 2023-12-29 DIAGNOSIS — K02.9 DENTAL CARIES: ICD-10-CM

## 2023-12-29 LAB
B-HCG UR QL: NEGATIVE
CTP QC/QA: YES

## 2023-12-29 PROCEDURE — 81025 URINE PREGNANCY TEST: CPT | Performed by: NURSE PRACTITIONER

## 2023-12-29 PROCEDURE — 99284 EMERGENCY DEPT VISIT MOD MDM: CPT

## 2023-12-29 RX ORDER — AMOXICILLIN 500 MG/1
500 CAPSULE ORAL 3 TIMES DAILY
Qty: 21 CAPSULE | Refills: 0 | Status: SHIPPED | OUTPATIENT
Start: 2023-12-29 | End: 2024-01-05

## 2023-12-29 RX ORDER — ACETAMINOPHEN AND CODEINE PHOSPHATE 300; 30 MG/1; MG/1
1 TABLET ORAL EVERY 6 HOURS PRN
Qty: 12 TABLET | Refills: 0 | Status: SHIPPED | OUTPATIENT
Start: 2023-12-29 | End: 2024-01-08

## 2023-12-29 NOTE — ED PROVIDER NOTES
Encounter Date: 12/29/2023       History     Chief Complaint   Patient presents with    Dental Pain    SCALP TENDERNESS     PT W CO RT SIDED SCALP TENDERNESS AND DENTAL PAIN X 2 DAYS.      The patient presents with dental pain. The onset was 2 days ago. The course/duration of symptoms is constant. Type of injury: none.  Location: left lower. The character of symptoms is pain and swelling. The degree of pain is minimal. The exacerbating factor is eating. The relieving factor is none.  Risk factors consist of none.  Prior episodes: occasional.  Therapy today: none.  Associated symptoms: none, denies fever, denies chills, denies nausea, denies vomiting, denies sore throat and denies cough.  Additional history: she also reports mild tenderness to right lateral scalp at and area she recently felt a small bump.         Review of patient's allergies indicates:  No Known Allergies  Past Medical History:   Diagnosis Date    Asthma     Hypertension      History reviewed. No pertinent surgical history.  History reviewed. No pertinent family history.  Social History     Tobacco Use    Smoking status: Never    Smokeless tobacco: Never   Substance Use Topics    Alcohol use: Never    Drug use: Yes     Types: Marijuana     Comment: daily     Review of Systems   Constitutional:  Negative for fever.   HENT:  Positive for dental problem. Negative for sore throat.    Respiratory:  Negative for shortness of breath.    Cardiovascular:  Negative for chest pain.   Gastrointestinal:  Negative for nausea.   Genitourinary:  Negative for dysuria.   Musculoskeletal:  Negative for back pain.   Skin:  Negative for rash.   Neurological:  Negative for weakness.   Hematological:  Does not bruise/bleed easily.   All other systems reviewed and are negative.      Physical Exam     Initial Vitals [12/29/23 0946]   BP Pulse Resp Temp SpO2   (!) 152/92 70 16 97.9 °F (36.6 °C) 100 %      MAP       --         Physical Exam    Nursing note and vitals  reviewed.  Constitutional: She appears well-developed and well-nourished.   HENT:   Head: Normocephalic and atraumatic.   Right Ear: Tympanic membrane normal.   Left Ear: Tympanic membrane normal.   Nose: Nose normal.   Mouth/Throat: Uvula is midline, oropharynx is clear and moist and mucous membranes are normal.   Mild left lower dental abscess with dental caries   Neck: Neck supple.   Normal range of motion.  Cardiovascular:  Normal rate, regular rhythm, normal heart sounds and intact distal pulses.           Pulmonary/Chest: Breath sounds normal.   Abdominal: Abdomen is soft. Bowel sounds are normal.   Musculoskeletal:         General: Normal range of motion.      Cervical back: Normal range of motion and neck supple.     Neurological: She is alert. She has normal strength.   Skin: Skin is warm and dry.   No abnormality appreciated on scalp at area of concern, no rash/erythema/or tenderness   Psychiatric: She has a normal mood and affect.         ED Course   Procedures  Labs Reviewed   POCT URINE PREGNANCY          Imaging Results    None          Medications - No data to display  Medical Decision Making  The patient presents with dental pain. The onset was 2 days ago. The course/duration of symptoms is constant. Type of injury: none.  Location: left lower. The character of symptoms is pain and swelling. The degree of pain is minimal. The exacerbating factor is eating. The relieving factor is none.  Risk factors consist of none.  Prior episodes: occasional.  Therapy today: none.  Associated symptoms: none, denies fever, denies chills, denies nausea, denies vomiting, denies sore throat and denies cough.  Additional history: she also reports mild tenderness to right lateral scalp at and area she recently felt a small bump.       10:02 AM DISPOSITION: The patient is resting comfortably in no acute distress.  She is hemodynamically stable and is without objective evidence for acute process requiring urgent  intervention or hospitalization. I provided counseling to patient with regard to condition, the treatment plan, specific conditions for return, and the importance of follow up. Detailed written and verbal instructions provided to patient and she expressed a verbal understanding of the discharge instructions and overall management plan. Reiterated the importance of medication administration and safety and advised patient to follow up with primary care provider in 3-5 days or sooner if needed.  Answered questions at this time. The patient is stable for discharge.       Amount and/or Complexity of Data Reviewed  Labs: ordered.      Additional MDM:   Differential Diagnosis:   Dental abscess, facial bones infections, soft tissue facial infections, osteomyelitis, facial bones fractures, among others                                     Clinical Impression:  Final diagnoses:  [K08.89] Pain, dental (Primary)  [K04.7] Dental abscess  [K02.9] Dental caries          ED Disposition Condition    Discharge Stable          ED Prescriptions       Medication Sig Dispense Start Date End Date Auth. Provider    amoxicillin (AMOXIL) 500 MG capsule Take 1 capsule (500 mg total) by mouth 3 (three) times daily. for 7 days 21 capsule 12/29/2023 1/5/2024 Yonathan Bergeron ACNP    acetaminophen-codeine 300-30mg (TYLENOL #3) 300-30 mg Tab Take 1 tablet by mouth every 6 (six) hours as needed. 12 tablet 12/29/2023 1/8/2024 Yonathan Bergeron ACNP          Follow-up Information       Follow up With Specialties Details Why Contact Info    follow up with dentist of choice in 2-3 days        [facility] Emergency Medicine  If symptoms worsen [address] [facility] [address] [phone]             Yonathan Bergeron ACNP  12/29/23 6844

## 2024-01-10 ENCOUNTER — OFFICE VISIT (OUTPATIENT)
Dept: INTERNAL MEDICINE | Facility: CLINIC | Age: 34
End: 2024-01-10
Payer: MEDICAID

## 2024-01-10 ENCOUNTER — HOSPITAL ENCOUNTER (OUTPATIENT)
Dept: RADIOLOGY | Facility: HOSPITAL | Age: 34
Discharge: HOME | End: 2024-01-10
Attending: NURSE PRACTITIONER
Payer: MEDICAID

## 2024-01-10 VITALS
RESPIRATION RATE: 18 BRPM | TEMPERATURE: 98 F | SYSTOLIC BLOOD PRESSURE: 122 MMHG | WEIGHT: 260.63 LBS | HEIGHT: 65 IN | HEART RATE: 64 BPM | BODY MASS INDEX: 43.42 KG/M2 | DIASTOLIC BLOOD PRESSURE: 76 MMHG

## 2024-01-10 DIAGNOSIS — E66.9 OBESITY, UNSPECIFIED CLASSIFICATION, UNSPECIFIED OBESITY TYPE, UNSPECIFIED WHETHER SERIOUS COMORBIDITY PRESENT: ICD-10-CM

## 2024-01-10 DIAGNOSIS — Z23 NEED FOR VACCINATION: Primary | ICD-10-CM

## 2024-01-10 DIAGNOSIS — R00.2 PALPITATIONS: ICD-10-CM

## 2024-01-10 DIAGNOSIS — J45.909 ASTHMA, UNSPECIFIED ASTHMA SEVERITY, UNSPECIFIED WHETHER COMPLICATED, UNSPECIFIED WHETHER PERSISTENT: ICD-10-CM

## 2024-01-10 DIAGNOSIS — Z00.00 WELL ADULT EXAM: ICD-10-CM

## 2024-01-10 DIAGNOSIS — I10 PRIMARY HYPERTENSION: ICD-10-CM

## 2024-01-10 DIAGNOSIS — I21.4 NSTEMI (NON-ST ELEVATED MYOCARDIAL INFARCTION): ICD-10-CM

## 2024-01-10 DIAGNOSIS — E78.5 HYPERLIPIDEMIA, UNSPECIFIED HYPERLIPIDEMIA TYPE: ICD-10-CM

## 2024-01-10 DIAGNOSIS — F41.9 ANXIETY: ICD-10-CM

## 2024-01-10 PROCEDURE — 3078F DIAST BP <80 MM HG: CPT | Mod: CPTII,,, | Performed by: NURSE PRACTITIONER

## 2024-01-10 PROCEDURE — 1160F RVW MEDS BY RX/DR IN RCRD: CPT | Mod: CPTII,,, | Performed by: NURSE PRACTITIONER

## 2024-01-10 PROCEDURE — 3008F BODY MASS INDEX DOCD: CPT | Mod: CPTII,,, | Performed by: NURSE PRACTITIONER

## 2024-01-10 PROCEDURE — 71046 X-RAY EXAM CHEST 2 VIEWS: CPT | Mod: TC

## 2024-01-10 PROCEDURE — 1159F MED LIST DOCD IN RCRD: CPT | Mod: CPTII,,, | Performed by: NURSE PRACTITIONER

## 2024-01-10 PROCEDURE — 99214 OFFICE O/P EST MOD 30 MIN: CPT | Mod: PBBFAC | Performed by: NURSE PRACTITIONER

## 2024-01-10 PROCEDURE — 3074F SYST BP LT 130 MM HG: CPT | Mod: CPTII,,, | Performed by: NURSE PRACTITIONER

## 2024-01-10 PROCEDURE — 90686 IIV4 VACC NO PRSV 0.5 ML IM: CPT | Mod: PBBFAC

## 2024-01-10 PROCEDURE — 90471 IMMUNIZATION ADMIN: CPT | Mod: PBBFAC

## 2024-01-10 PROCEDURE — 4010F ACE/ARB THERAPY RXD/TAKEN: CPT | Mod: CPTII,,, | Performed by: NURSE PRACTITIONER

## 2024-01-10 PROCEDURE — 99205 OFFICE O/P NEW HI 60 MIN: CPT | Mod: S$PBB,,, | Performed by: NURSE PRACTITIONER

## 2024-01-10 RX ORDER — ALBUTEROL SULFATE 90 UG/1
2 AEROSOL, METERED RESPIRATORY (INHALATION) EVERY 6 HOURS PRN
Qty: 18 G | Refills: 6 | Status: SHIPPED | OUTPATIENT
Start: 2024-01-10

## 2024-01-10 RX ORDER — SERTRALINE HYDROCHLORIDE 50 MG/1
TABLET, FILM COATED ORAL
Qty: 30 TABLET | Refills: 3 | Status: SHIPPED | OUTPATIENT
Start: 2024-01-10 | End: 2024-02-20 | Stop reason: SDUPTHER

## 2024-01-10 RX ORDER — FLUTICASONE PROPIONATE AND SALMETEROL XINAFOATE 230; 21 UG/1; UG/1
2 AEROSOL, METERED RESPIRATORY (INHALATION) 2 TIMES DAILY
Qty: 12 G | Refills: 3 | Status: SHIPPED | OUTPATIENT
Start: 2024-01-10 | End: 2024-02-20 | Stop reason: SDUPTHER

## 2024-01-10 RX ORDER — METOPROLOL SUCCINATE 50 MG/1
50 TABLET, EXTENDED RELEASE ORAL DAILY
Qty: 30 TABLET | Refills: 3 | Status: SHIPPED | OUTPATIENT
Start: 2024-01-10 | End: 2024-02-20 | Stop reason: SDUPTHER

## 2024-01-10 RX ORDER — CHLORTHALIDONE 50 MG/1
50 TABLET ORAL
COMMUNITY
Start: 2023-12-21 | End: 2024-02-20 | Stop reason: SDUPTHER

## 2024-01-10 RX ADMIN — INFLUENZA VIRUS VACCINE 0.5 ML: 15; 15; 15; 15 SUSPENSION INTRAMUSCULAR at 11:01

## 2024-01-10 NOTE — PROGRESS NOTES
Patient ID: [id number]     Chief Complaint: Establish Care (Not fasting, wants flu vaccine, refills )        HPI:     HPI      Elian Mann is a 33 y.o. female here today for a follow up.         Immunizations:   Immunization History   Administered Date(s) Administered    Influenza - Quadrivalent - PF *Preferred* (6 months and older) 01/10/2024        ----------------------------  Asthma  Hypertension     History reviewed. No pertinent surgical history.    Review of patient's allergies indicates:  No Known Allergies    Current Outpatient Medications   Medication Instructions    albuterol (PROVENTIL/VENTOLIN HFA) 90 mcg/actuation inhaler 2 puffs, Inhalation, Every 6 hours PRN    amLODIPine (NORVASC) 10 mg, Oral    atorvastatin (LIPITOR) 20 mg, Oral, Nightly    chlorthalidone (HYGROTEN) 50 mg, Oral    ergocalciferol (ERGOCALCIFEROL) 50,000 unit Cap Oral    FEROSUL 325 mg (65 mg iron) Tab tablet Oral    fluticasone-salmeterol 230-21 mcg/dose (ADVAIR HFA) 230-21 mcg/actuation HFAA inhaler 2 puffs, Inhalation, 2 times daily, Controller    hydrALAZINE (APRESOLINE) 100 mg, Oral, 3 times daily    hydroCHLOROthiazide (HYDRODIURIL) 25 mg, Oral    losartan (COZAAR) 100 mg, Oral    metoprolol succinate (TOPROL-XL) 50 mg, Oral, Daily    ondansetron (ZOFRAN-ODT) 4 mg, Oral, Every 6 hours PRN    sertraline (ZOLOFT) 50 MG tablet Take 1/2 tab po Q hs X 1 week then increase to 1 tab po Q hs.       Social History     Socioeconomic History    Marital status: Single   Tobacco Use    Smoking status: Never     Passive exposure: Never    Smokeless tobacco: Never   Substance and Sexual Activity    Alcohol use: Never    Drug use: Not Currently     Types: Marijuana     Comment: states quit 4-5 months     Social Determinants of Health     Transportation Needs: No Transportation Needs (1/10/2024)    PRAPARE - Transportation     Lack of Transportation (Medical): No     Lack of Transportation (Non-Medical): No   Housing Stability: Low Risk   "(1/10/2024)    Housing Stability Vital Sign     Unable to Pay for Housing in the Last Year: No     Number of Places Lived in the Last Year: 1     Unstable Housing in the Last Year: No        History reviewed. No pertinent family history.     Patient Care Team:  Coco Mcneill FNP as PCP - General (Family Medicine)     Subjective:     Review of Systems     See HPI for details    Constitutional: Denies Change in appetite. Denies Chills. Denies Fever. Denies Night sweats.  Eye: Denies Blurred vision. Denies Discharge. Denies Eye pain.  ENT: Denies Decreased hearing. Denies Sore throat. Denies Swollen glands.  Respiratory: Denies Cough. Denies Shortness of breath. Denies Shortness of breath with exertion. Denies Wheezing.  Cardiovascular: DeniesChest pain at rest. Denies Chest pain with exertion. Denies Irregular heartbeat. Denies Palpitations. Denies Edema.  Gastrointestinal: Denies Abdominal pain. DeniesDiarrhea. Denies Nausea. Denies Vomiting. Denies Hematemesis or Hematochezia.  Genitourinary: Denies Dysuria. Denies Urinary frequency. Denies Urinary urgency. Denies Blood in urine.  Endocrine: Denies Cold intolerance. Denies Excessive thirst. Denies Heat intolerance. Denies Weight loss. Denies Weight gain.  Musculoskeletal: Denies Painful joints. Denies Weakness.  Integumentary: Denies Rash. Denies Itching. Denies Dry skin.  Neurologic: Denies Dizziness. Denies Fainting. Denies Headache.  Psychiatric: Denies Depression. Denies Anxiety. Denies Suicidal/Homicidal ideations.    All Other ROS: Negative except as stated in HPI.       Objective:     Visit Vitals  /76 (BP Location: Right arm, Patient Position: Sitting, BP Method: Large (Automatic))   Pulse 64   Temp 98.4 °F (36.9 °C) (Oral)   Resp 18   Ht 5' 5" (1.651 m)   Wt 118.2 kg (260 lb 9.6 oz)   LMP 12/31/2023 (Exact Date)   BMI 43.37 kg/m²       Physical Exam    General: Alert and oriented, No acute distress.  Head: Normocephalic, Atraumatic.  Eye: Pupils " are equal, round and reactive to light, Extraocular movements are intact, Sclera non-icteric.  Ears/Nose/Throat: Normal, Mucosa moist,Clear.  Neck/Thyroid: Supple, Non-tender, No carotid bruit, No lymphadenopathy, No JVD, Full range of motion.  Respiratory: Clear to auscultation bilaterally; No wheezes, rales or rhonchi,Non-labored respirations, Symmetrical chest wall expansion.  Cardiovascular: Regular rate and rhythm, S1/S2 normal, No murmurs, rubs or gallops.  Gastrointestinal: Soft, Non-tender, Non-distended, Normal bowel sounds, No palpable organomegaly.  Musculoskeletal: Normal range of motion.  Integumentary: Warm, Dry, Intact, No suspicious lesions or rashes.  Extremities: No clubbing, cyanosis or edema  Neurologic: No focal deficits, Cranial Nerves II-XII are grossly intact, Motor strength normal upper and lower extremities, Sensory exam intact.  Psychiatric: Normal interaction, Coherent speech, Euthymic mood, Appropriate affect       Labs Reviewed:     Chemistry:  Lab Results   Component Value Date     03/04/2023     09/28/2021    K 3.4 (L) 03/04/2023    K 4.0 09/28/2021    CHLORIDE 106 03/04/2023    BUN 10.8 03/04/2023    BUN 13 09/28/2021    CREATININE 0.91 03/04/2023    CREATININE 1.31 (H) 09/28/2021    EGFRNORACEVR >60 03/04/2023    GLUCOSE 168 (H) 03/04/2023    CALCIUM 9.6 03/04/2023    CALCIUM 9.2 09/28/2021    ALKPHOS 73 03/04/2023    LABPROT 8.0 03/04/2023    ALBUMIN 3.8 03/04/2023    BILIDIR 0.2 07/22/2020    IBILI 0.3 07/22/2020    AST 17 03/04/2023    ALT 13 03/04/2023    MG 2.2 03/17/2019    PHOS 3.9 03/17/2019        Lab Results   Component Value Date    HGBA1C 5.0 07/22/2020        Hematology:  Lab Results   Component Value Date    WBC 11.5 03/04/2023    HGB 12.7 03/04/2023    HCT 38.4 03/04/2023     03/04/2023       Lipid Panel:  Lab Results   Component Value Date    CHOL 188 12/08/2020    HDL 47 12/08/2020    .00 12/08/2020    TRIG 89 12/08/2020    TOTALCHOLEST 4  12/08/2020        Urine:  Lab Results   Component Value Date    COLORUA Light-Yellow 03/04/2023    APPEARANCEUA Clear 03/04/2023    SGUA 1.021 03/04/2023    PHUA 8.0 03/04/2023    PROTEINUA Trace (A) 03/04/2023    GLUCOSEUA Normal 03/04/2023    KETONESUA 2+ (A) 03/04/2023    BLOODUA Negative 03/04/2023    NITRITESUA Negative 03/04/2023    LEUKOCYTESUR Negative 03/04/2023    RBCUA 0-5 03/04/2023    WBCUA 0-5 03/04/2023    BACTERIA Trace (A) 03/04/2023    SQEPUA Occ (A) 03/04/2023    HYALINECASTS None Seen 03/04/2023    CREATRANDUR 38.0 03/18/2019    PROTEINURINE <5.0 03/18/2019        Assessment:       ICD-10-CM ICD-9-CM   1. Need for vaccination  Z23 V05.9   2. Asthma, unspecified asthma severity, unspecified whether complicated, unspecified whether persistent  J45.909 493.90   3. Primary hypertension  I10 401.9   4. Palpitations  R00.2 785.1   5. Anxiety  F41.9 300.00   6. Well adult exam  Z00.00 V70.0   7. NSTEMI (non-ST elevated myocardial infarction)  I21.4 410.70   8. Hyperlipidemia, unspecified hyperlipidemia type  E78.5 272.4   9. Obesity, unspecified classification, unspecified obesity type, unspecified whether serious comorbidity present  E66.9 278.00        Plan:     1. Need for vaccination  Give flu vaccine today.   - influenza (QUADRIVALENT PF) vaccine 0.5 mL    2. Asthma, unspecified asthma severity, unspecified whether complicated, unspecified whether persistent  PFT in 1 month. CXR in 1 month. Pt currently on Carvedilol BID however is not cardio selective and could be triggering her asthma. D/C Carvediolol. Start Metoprolol succ 50 mg 1 tab po daily. Refilled Albuterol inhaler as prescribed prn SOB, Wheezing. Pt denies being on controller medication and winter season affects her the most. Will start on Advair -21 mg 2 puffs inhaled BID.     3. Primary hypertension  BP controlled. Low fat low salt diet and exercise. Cont     4. Palpitations  Holter monitor in 1 month. Echo in 1 month. Refer to  Cardio cl for eval and mgmt.     5. Anxiety  Denies SI/HI. Cont Sertraline as prescribed.     6. Well adult exam  Labs in 1 month.     7. NSTEMI (non-ST elevated myocardial infarction)  Pt has hx NSTEMI 11/2019. ER precautions given. Refer to Cardio cl for further eval and mgmt.    8. Hyperlipidemia, unspecified hyperlipidemia type  Low fat diet and exercise. Cont Atorvastatin as prescribed. Lipid in 1 month.    9. Obesity, unspecified classification, unspecified obesity type, unspecified whether serious comorbidity present  Encouraged low fat diet and exercise. Education provided.      10. Need for vaccination  Give flu vaccine today.     Follow up in about 1 month (around 2/10/2024) for with labs 1 week prior to appt, f/u holter and Echo.. In addition to their scheduled follow up, the patient has also been instructed to follow up on as needed basis.     No future appointments.     Coco Mcneill, RACHEL

## 2024-02-19 ENCOUNTER — LAB VISIT (OUTPATIENT)
Dept: LAB | Facility: HOSPITAL | Age: 34
End: 2024-02-19
Attending: NURSE PRACTITIONER
Payer: MEDICAID

## 2024-02-19 DIAGNOSIS — R00.2 PALPITATIONS: ICD-10-CM

## 2024-02-19 DIAGNOSIS — I10 PRIMARY HYPERTENSION: ICD-10-CM

## 2024-02-19 DIAGNOSIS — F41.9 ANXIETY: Primary | ICD-10-CM

## 2024-02-19 LAB
ALBUMIN SERPL-MCNC: 3.5 G/DL (ref 3.5–5)
ALBUMIN/GLOB SERPL: 0.9 RATIO (ref 1.1–2)
ALP SERPL-CCNC: 71 UNIT/L (ref 40–150)
ALT SERPL-CCNC: 17 UNIT/L (ref 0–55)
AST SERPL-CCNC: 20 UNIT/L (ref 5–34)
BASOPHILS # BLD AUTO: 0.05 X10(3)/MCL
BASOPHILS NFR BLD AUTO: 0.9 %
BILIRUB SERPL-MCNC: 0.8 MG/DL
BUN SERPL-MCNC: 19.8 MG/DL (ref 7–18.7)
CALCIUM SERPL-MCNC: 9.1 MG/DL (ref 8.4–10.2)
CHLORIDE SERPL-SCNC: 105 MMOL/L (ref 98–107)
CHOLEST SERPL-MCNC: 161 MG/DL
CHOLEST/HDLC SERPL: 3 {RATIO} (ref 0–5)
CO2 SERPL-SCNC: 28 MMOL/L (ref 22–29)
CREAT SERPL-MCNC: 0.86 MG/DL (ref 0.55–1.02)
EOSINOPHIL # BLD AUTO: 0.17 X10(3)/MCL (ref 0–0.9)
EOSINOPHIL NFR BLD AUTO: 3.1 %
ERYTHROCYTE [DISTWIDTH] IN BLOOD BY AUTOMATED COUNT: 12.6 % (ref 11.5–17)
GFR SERPLBLD CREATININE-BSD FMLA CKD-EPI: >60 MLS/MIN/1.73/M2
GLOBULIN SER-MCNC: 4.1 GM/DL (ref 2.4–3.5)
GLUCOSE SERPL-MCNC: 112 MG/DL (ref 74–100)
HCT VFR BLD AUTO: 35.6 % (ref 37–47)
HDLC SERPL-MCNC: 62 MG/DL (ref 35–60)
HGB BLD-MCNC: 11.7 G/DL (ref 12–16)
IMM GRANULOCYTES # BLD AUTO: 0.02 X10(3)/MCL (ref 0–0.04)
IMM GRANULOCYTES NFR BLD AUTO: 0.4 %
LDLC SERPL CALC-MCNC: 91 MG/DL (ref 50–140)
LYMPHOCYTES # BLD AUTO: 1.86 X10(3)/MCL (ref 0.6–4.6)
LYMPHOCYTES NFR BLD AUTO: 34.4 %
MCH RBC QN AUTO: 32.2 PG (ref 27–31)
MCHC RBC AUTO-ENTMCNC: 32.9 G/DL (ref 33–36)
MCV RBC AUTO: 98.1 FL (ref 80–94)
MONOCYTES # BLD AUTO: 0.46 X10(3)/MCL (ref 0.1–1.3)
MONOCYTES NFR BLD AUTO: 8.5 %
NEUTROPHILS # BLD AUTO: 2.84 X10(3)/MCL (ref 2.1–9.2)
NEUTROPHILS NFR BLD AUTO: 52.7 %
NRBC BLD AUTO-RTO: 0 %
PLATELET # BLD AUTO: 262 X10(3)/MCL (ref 130–400)
PMV BLD AUTO: 11.1 FL (ref 7.4–10.4)
POTASSIUM SERPL-SCNC: 3.3 MMOL/L (ref 3.5–5.1)
PROT SERPL-MCNC: 7.6 GM/DL (ref 6.4–8.3)
RBC # BLD AUTO: 3.63 X10(6)/MCL (ref 4.2–5.4)
SODIUM SERPL-SCNC: 141 MMOL/L (ref 136–145)
TRIGL SERPL-MCNC: 42 MG/DL (ref 37–140)
VLDLC SERPL CALC-MCNC: 8 MG/DL
WBC # SPEC AUTO: 5.4 X10(3)/MCL (ref 4.5–11.5)

## 2024-02-19 PROCEDURE — 80061 LIPID PANEL: CPT

## 2024-02-19 PROCEDURE — 85025 COMPLETE CBC W/AUTO DIFF WBC: CPT

## 2024-02-19 PROCEDURE — 80053 COMPREHEN METABOLIC PANEL: CPT

## 2024-02-19 PROCEDURE — 36415 COLL VENOUS BLD VENIPUNCTURE: CPT

## 2024-02-20 ENCOUNTER — OFFICE VISIT (OUTPATIENT)
Dept: INTERNAL MEDICINE | Facility: CLINIC | Age: 34
End: 2024-02-20
Payer: MEDICAID

## 2024-02-20 VITALS
DIASTOLIC BLOOD PRESSURE: 79 MMHG | HEIGHT: 65 IN | BODY MASS INDEX: 43.37 KG/M2 | HEART RATE: 75 BPM | RESPIRATION RATE: 18 BRPM | TEMPERATURE: 98 F | SYSTOLIC BLOOD PRESSURE: 125 MMHG

## 2024-02-20 DIAGNOSIS — E87.6 HYPOKALEMIA: ICD-10-CM

## 2024-02-20 DIAGNOSIS — J45.909 ASTHMA, UNSPECIFIED ASTHMA SEVERITY, UNSPECIFIED WHETHER COMPLICATED, UNSPECIFIED WHETHER PERSISTENT: ICD-10-CM

## 2024-02-20 DIAGNOSIS — E66.9 OBESITY, UNSPECIFIED CLASSIFICATION, UNSPECIFIED OBESITY TYPE, UNSPECIFIED WHETHER SERIOUS COMORBIDITY PRESENT: ICD-10-CM

## 2024-02-20 DIAGNOSIS — Z00.00 WELL ADULT EXAM: ICD-10-CM

## 2024-02-20 DIAGNOSIS — E78.2 MIXED HYPERLIPIDEMIA: ICD-10-CM

## 2024-02-20 DIAGNOSIS — R79.89 ABNORMAL CBC: ICD-10-CM

## 2024-02-20 DIAGNOSIS — F41.9 ANXIETY: ICD-10-CM

## 2024-02-20 DIAGNOSIS — I21.4 NSTEMI (NON-ST ELEVATED MYOCARDIAL INFARCTION): ICD-10-CM

## 2024-02-20 DIAGNOSIS — R00.2 PALPITATIONS: ICD-10-CM

## 2024-02-20 DIAGNOSIS — I10 PRIMARY HYPERTENSION: Primary | ICD-10-CM

## 2024-02-20 PROCEDURE — 1159F MED LIST DOCD IN RCRD: CPT | Mod: CPTII,,, | Performed by: NURSE PRACTITIONER

## 2024-02-20 PROCEDURE — 99214 OFFICE O/P EST MOD 30 MIN: CPT | Mod: S$PBB,,, | Performed by: NURSE PRACTITIONER

## 2024-02-20 PROCEDURE — 3008F BODY MASS INDEX DOCD: CPT | Mod: CPTII,,, | Performed by: NURSE PRACTITIONER

## 2024-02-20 PROCEDURE — 3078F DIAST BP <80 MM HG: CPT | Mod: CPTII,,, | Performed by: NURSE PRACTITIONER

## 2024-02-20 PROCEDURE — 4010F ACE/ARB THERAPY RXD/TAKEN: CPT | Mod: CPTII,,, | Performed by: NURSE PRACTITIONER

## 2024-02-20 PROCEDURE — 3074F SYST BP LT 130 MM HG: CPT | Mod: CPTII,,, | Performed by: NURSE PRACTITIONER

## 2024-02-20 PROCEDURE — 1160F RVW MEDS BY RX/DR IN RCRD: CPT | Mod: CPTII,,, | Performed by: NURSE PRACTITIONER

## 2024-02-20 PROCEDURE — 99214 OFFICE O/P EST MOD 30 MIN: CPT | Mod: PBBFAC | Performed by: NURSE PRACTITIONER

## 2024-02-20 RX ORDER — ATORVASTATIN CALCIUM 20 MG/1
20 TABLET, FILM COATED ORAL NIGHTLY
Qty: 90 TABLET | Refills: 1 | Status: SHIPPED | OUTPATIENT
Start: 2024-02-20 | End: 2024-05-21 | Stop reason: SDUPTHER

## 2024-02-20 RX ORDER — CHLORTHALIDONE 50 MG/1
50 TABLET ORAL DAILY
Qty: 90 TABLET | Refills: 1 | Status: SHIPPED | OUTPATIENT
Start: 2024-02-20 | End: 2024-05-21 | Stop reason: SDUPTHER

## 2024-02-20 RX ORDER — SERTRALINE HYDROCHLORIDE 50 MG/1
TABLET, FILM COATED ORAL
Qty: 90 TABLET | Refills: 1 | Status: SHIPPED | OUTPATIENT
Start: 2024-02-20 | End: 2024-05-21 | Stop reason: SDUPTHER

## 2024-02-20 RX ORDER — AMLODIPINE BESYLATE 10 MG/1
10 TABLET ORAL DAILY
Qty: 90 TABLET | Refills: 1 | Status: SHIPPED | OUTPATIENT
Start: 2024-02-20 | End: 2024-05-21 | Stop reason: SDUPTHER

## 2024-02-20 RX ORDER — METOPROLOL SUCCINATE 50 MG/1
50 TABLET, EXTENDED RELEASE ORAL DAILY
Qty: 90 TABLET | Refills: 1 | Status: SHIPPED | OUTPATIENT
Start: 2024-02-20 | End: 2024-05-21 | Stop reason: SDUPTHER

## 2024-02-20 RX ORDER — HYDRALAZINE HYDROCHLORIDE 100 MG/1
100 TABLET, FILM COATED ORAL 3 TIMES DAILY
Qty: 180 TABLET | Refills: 1 | Status: SHIPPED | OUTPATIENT
Start: 2024-02-20 | End: 2024-05-21 | Stop reason: SDUPTHER

## 2024-02-20 RX ORDER — LOSARTAN POTASSIUM 100 MG/1
100 TABLET ORAL DAILY
Qty: 90 TABLET | Refills: 1 | Status: SHIPPED | OUTPATIENT
Start: 2024-02-20 | End: 2024-05-21 | Stop reason: SDUPTHER

## 2024-02-20 RX ORDER — FLUTICASONE PROPIONATE AND SALMETEROL XINAFOATE 230; 21 UG/1; UG/1
2 AEROSOL, METERED RESPIRATORY (INHALATION) 2 TIMES DAILY
Qty: 12 G | Refills: 3 | Status: SHIPPED | OUTPATIENT
Start: 2024-02-20 | End: 2024-05-21 | Stop reason: SDUPTHER

## 2024-02-20 NOTE — PROGRESS NOTES
Patient ID: [id number]     Chief Complaint: lab results        HPI:     JASON Mann is a 33 y.o. female here today for a follow up.         Optometrist:  Dentist:  Breast Cancer Screening:  [unfilled]   Cervical Cancer Screening:     Colorectal Cancer Screening:  Diabetic Eye Exam:  Diabetic Foot Exam:  Lung Cancer Screening:    Prostate Cancer Screening:  AAA Screening:  Osteoporosis Screening:  Medicare Wellness:   Immunizations:   Immunization History   Administered Date(s) Administered    Influenza - Quadrivalent - PF *Preferred* (6 months and older) 01/10/2024        ----------------------------  Asthma  Hypertension     History reviewed. No pertinent surgical history.    Review of patient's allergies indicates:  No Known Allergies    Current Outpatient Medications   Medication Instructions    albuterol (PROVENTIL/VENTOLIN HFA) 90 mcg/actuation inhaler 2 puffs, Inhalation, Every 6 hours PRN    amLODIPine (NORVASC) 10 mg, Oral, Daily    atorvastatin (LIPITOR) 20 mg, Oral, Nightly    chlorthalidone (HYGROTEN) 50 mg, Oral, Daily    ergocalciferol (ERGOCALCIFEROL) 50,000 unit Cap Oral    FEROSUL 325 mg (65 mg iron) Tab tablet Oral    fluticasone-salmeterol 230-21 mcg/dose (ADVAIR HFA) 230-21 mcg/actuation HFAA inhaler 2 puffs, Inhalation, 2 times daily, Controller    hydrALAZINE (APRESOLINE) 100 mg, Oral, 3 times daily    losartan (COZAAR) 100 mg, Oral, Daily    metoprolol succinate (TOPROL-XL) 50 mg, Oral, Daily    ondansetron (ZOFRAN-ODT) 4 mg, Oral, Every 6 hours PRN    sertraline (ZOLOFT) 50 MG tablet Take 1/2 tab po Q hs X 1 week then increase to 1 tab po Q hs.       Social History     Socioeconomic History    Marital status: Single   Tobacco Use    Smoking status: Never     Passive exposure: Never    Smokeless tobacco: Never   Substance and Sexual Activity    Alcohol use: Never    Drug use: Not Currently     Types: Marijuana     Comment: states quit 4-5 months     Social Determinants of  Health     Transportation Needs: No Transportation Needs (1/10/2024)    PRAPARE - Transportation     Lack of Transportation (Medical): No     Lack of Transportation (Non-Medical): No   Housing Stability: Low Risk  (1/10/2024)    Housing Stability Vital Sign     Unable to Pay for Housing in the Last Year: No     Number of Places Lived in the Last Year: 1     Unstable Housing in the Last Year: No        History reviewed. No pertinent family history.     Patient Care Team:  Coco Mcneill FNP as PCP - General (Family Medicine)     Subjective:     Review of Systems     See HPI for details    Constitutional: Denies Change in appetite. Denies Chills. Denies Fever. Denies Night sweats.  Eye: Denies Blurred vision. Denies Discharge. Denies Eye pain.  ENT: Denies Decreased hearing. Denies Sore throat. Denies Swollen glands.  Respiratory: Denies Cough. Denies Shortness of breath. Denies Shortness of breath with exertion. Denies Wheezing.  Cardiovascular: DeniesChest pain at rest. Denies Chest pain with exertion. Denies Irregular heartbeat. Denies Palpitations. Denies Edema.  Gastrointestinal: Denies Abdominal pain. DeniesDiarrhea. Denies Nausea. Denies Vomiting. Denies Hematemesis or Hematochezia.  Genitourinary: Denies Dysuria. Denies Urinary frequency. Denies Urinary urgency. Denies Blood in urine.  Endocrine: Denies Cold intolerance. Denies Excessive thirst. Denies Heat intolerance. Denies Weight loss. Denies Weight gain.  Musculoskeletal: Denies Painful joints. Denies Weakness.  Integumentary: Denies Rash. Denies Itching. Denies Dry skin.  Neurologic: Denies Dizziness. Denies Fainting. Denies Headache.  Psychiatric: Denies Depression. Denies Anxiety. Denies Suicidal/Homicidal ideations.    All Other ROS: Negative except as stated in HPI.       Objective:     Visit Vitals  /79 (BP Location: Right arm, Patient Position: Sitting, BP Method: Large (Automatic))   Pulse 75   Temp 98.3 °F (36.8 °C) (Oral)   Resp 18  "  Ht 5' 5" (1.651 m)   BMI 43.37 kg/m²       Physical Exam    General: Alert and oriented, No acute distress.  Head: Normocephalic, Atraumatic.  Eye: Pupils are equal, round and reactive to light, Extraocular movements are intact, Sclera non-icteric.  Ears/Nose/Throat: Normal, Mucosa moist,Clear.  Neck/Thyroid: Supple, Non-tender, No carotid bruit, No lymphadenopathy, No JVD, Full range of motion.  Respiratory: Clear to auscultation bilaterally; No wheezes, rales or rhonchi,Non-labored respirations, Symmetrical chest wall expansion.  Cardiovascular: Regular rate and rhythm, S1/S2 normal, No murmurs, rubs or gallops.  Gastrointestinal: Soft, Non-tender, Non-distended, Normal bowel sounds, No palpable organomegaly.  Musculoskeletal: Normal range of motion.  Integumentary: Warm, Dry, Intact, No suspicious lesions or rashes.  Extremities: No clubbing, cyanosis or edema  Neurologic: No focal deficits, Cranial Nerves II-XII are grossly intact, Motor strength normal upper and lower extremities, Sensory exam intact.  Psychiatric: Normal interaction, Coherent speech, Euthymic mood, Appropriate affect       Labs Reviewed:     Chemistry:  Lab Results   Component Value Date     02/19/2024     09/28/2021    K 3.3 (L) 02/19/2024    K 4.0 09/28/2021    CHLORIDE 105 02/19/2024    BUN 19.8 (H) 02/19/2024    BUN 13 09/28/2021    CREATININE 0.86 02/19/2024    CREATININE 1.31 (H) 09/28/2021    EGFRNORACEVR >60 02/19/2024    GLUCOSE 112 (H) 02/19/2024    CALCIUM 9.1 02/19/2024    CALCIUM 9.2 09/28/2021    ALKPHOS 71 02/19/2024    LABPROT 7.6 02/19/2024    ALBUMIN 3.5 02/19/2024    BILIDIR 0.2 07/22/2020    IBILI 0.3 07/22/2020    AST 20 02/19/2024    ALT 17 02/19/2024    MG 2.2 03/17/2019    PHOS 3.9 03/17/2019        Lab Results   Component Value Date    HGBA1C 5.0 07/22/2020        Hematology:  Lab Results   Component Value Date    WBC 5.40 02/19/2024    HGB 11.7 (L) 02/19/2024    HCT 35.6 (L) 02/19/2024     " 02/19/2024       Lipid Panel:  Lab Results   Component Value Date    CHOL 161 02/19/2024    HDL 62 (H) 02/19/2024    LDL 91.00 02/19/2024    TRIG 42 02/19/2024    TOTALCHOLEST 3 02/19/2024        Urine:  Lab Results   Component Value Date    COLORUA Light-Yellow 03/04/2023    APPEARANCEUA Clear 03/04/2023    SGUA 1.021 03/04/2023    PHUA 8.0 03/04/2023    PROTEINUA Trace (A) 03/04/2023    GLUCOSEUA Normal 03/04/2023    KETONESUA 2+ (A) 03/04/2023    BLOODUA Negative 03/04/2023    NITRITESUA Negative 03/04/2023    LEUKOCYTESUR Negative 03/04/2023    RBCUA 0-5 03/04/2023    WBCUA 0-5 03/04/2023    BACTERIA Trace (A) 03/04/2023    SQEPUA Occ (A) 03/04/2023    HYALINECASTS None Seen 03/04/2023    CREATRANDUR 38.0 03/18/2019    PROTEINURINE <5.0 03/18/2019        Assessment:       ICD-10-CM ICD-9-CM   1. Primary hypertension  I10 401.9   2. Asthma, unspecified asthma severity, unspecified whether complicated, unspecified whether persistent  J45.909 493.90   3. Palpitations  R00.2 785.1   4. Anxiety  F41.9 300.00   5. Well adult exam  Z00.00 V70.0   6. NSTEMI (non-ST elevated myocardial infarction)  I21.4 410.70   7. Mixed hyperlipidemia  E78.2 272.2   8. Obesity, unspecified classification, unspecified obesity type, unspecified whether serious comorbidity present  E66.9 278.00   9. Abnormal CBC  R79.89 790.6   10. Hypokalemia  E87.6 276.8        Plan:     1. Asthma, unspecified asthma severity, unspecified whether complicated, unspecified whether persistent  Avoid triggers. Cont Albuterol and Advair HFA as prescribed. PT NS PFT. CXR 1-10-24 WNL.     2. Primary hypertension  BP controlled. Low fat low salt diet and exercise. Cont Metoprolol, Amlodipine, Chlorthalidone, Hydralazine, Losartan as prescribed.     3. Palpitations  Keep Cardio cl appt tomorrow. Pt denies further episodes of palpitations since changing med to Metoprolol.     4. Anxiety  Denies SI/HI. Cont Sertraline as prescribed.     5. Well adult exam  Labs in  3 months. [facility]- pt states she missed appt and she will call to schedule appt.     6. NSTEMI (non-ST elevated myocardial infarction)  Keep Cardio cl appt tomorrow.     7. Mixed hyperlipidemia  Low fat diet and exercise. Cont Atorvastatin as prescribed.     8. Obesity, unspecified classification, unspecified obesity type, unspecified whether serious comorbidity present  Encouraged low fat diet and exercise. Education provided.     9. Abnormal CBC  CBC in 3 months.     10. Hypokalemia  Potassium 3.3. Encouraged potassium rich foods in diet. Will recheck in 3 months.          Follow up in about 3 months (around 5/20/2024) for with labs 1 week prior to appt. . In addition to their scheduled follow up, the patient has also been instructed to follow up on as needed basis.     Future Appointments   Date Time Provider Department Center   2/21/2024 12:45 PM Rukhsana Malone FNP [facility] [place]    5/20/2024 12:20 PM [facility]ill [facility]        RACHEL [place]

## 2024-02-21 ENCOUNTER — OFFICE VISIT (OUTPATIENT)
Dept: CARDIOLOGY | Facility: CLINIC | Age: 34
End: 2024-02-21
Payer: MEDICAID

## 2024-02-21 VITALS
BODY MASS INDEX: 44.68 KG/M2 | HEIGHT: 65 IN | OXYGEN SATURATION: 95 % | RESPIRATION RATE: 18 BRPM | HEART RATE: 63 BPM | TEMPERATURE: 99 F | SYSTOLIC BLOOD PRESSURE: 130 MMHG | DIASTOLIC BLOOD PRESSURE: 80 MMHG | WEIGHT: 268.19 LBS

## 2024-02-21 DIAGNOSIS — I10 PRIMARY HYPERTENSION: Primary | ICD-10-CM

## 2024-02-21 DIAGNOSIS — E78.2 MIXED HYPERLIPIDEMIA: ICD-10-CM

## 2024-02-21 DIAGNOSIS — R07.89 CHEST HEAVINESS: ICD-10-CM

## 2024-02-21 DIAGNOSIS — R00.2 PALPITATIONS: ICD-10-CM

## 2024-02-21 LAB
OHS QRS DURATION: 98 MS
OHS QTC CALCULATION: 423 MS

## 2024-02-21 PROCEDURE — 3008F BODY MASS INDEX DOCD: CPT | Mod: CPTII,,, | Performed by: NURSE PRACTITIONER

## 2024-02-21 PROCEDURE — 99215 OFFICE O/P EST HI 40 MIN: CPT | Mod: PBBFAC | Performed by: NURSE PRACTITIONER

## 2024-02-21 PROCEDURE — 1159F MED LIST DOCD IN RCRD: CPT | Mod: CPTII,,, | Performed by: NURSE PRACTITIONER

## 2024-02-21 PROCEDURE — 1160F RVW MEDS BY RX/DR IN RCRD: CPT | Mod: CPTII,,, | Performed by: NURSE PRACTITIONER

## 2024-02-21 PROCEDURE — 3075F SYST BP GE 130 - 139MM HG: CPT | Mod: CPTII,,, | Performed by: NURSE PRACTITIONER

## 2024-02-21 PROCEDURE — 99214 OFFICE O/P EST MOD 30 MIN: CPT | Mod: S$PBB,,, | Performed by: NURSE PRACTITIONER

## 2024-02-21 PROCEDURE — 3079F DIAST BP 80-89 MM HG: CPT | Mod: CPTII,,, | Performed by: NURSE PRACTITIONER

## 2024-02-21 PROCEDURE — 4010F ACE/ARB THERAPY RXD/TAKEN: CPT | Mod: CPTII,,, | Performed by: NURSE PRACTITIONER

## 2024-02-21 NOTE — PROGRESS NOTES
CHIEF COMPLAINT:   Chief Complaint   Patient presents with    Referral for palpitations     Patient c/o having palpitations                                                  HPI:  Elian Mann 33 y.o. female with PMH HTN, HLD, asthma, obesity who presents to [facility] as an initial referral for palpitations.  Patient reports the palpitations have occurred intermittently over the past year with acute exacerbation during a time frame in which she was without her anxiety medications.  However, the patient reports resolution of her palpitations since resumption of her anxiolytic and initiation of metoprolol per her PCP.  She denies any current palpitations, shortness of breath, orthopnea, PND, peripheral edema, claudication, lightheadedness or syncope.  She does report occasional chest heaviness that typically occurs in the morning and is relieved with her inhaler.  The patient is unsure if the symptoms are related to her asthma or any other etiology.    She denies any anginal symptoms otherwise stating she is able to perform her regular activities including heavy housework without experiencing any ischemic symptoms.  However, she does admit that she is generally sedentary but does plan to increase her physical activity.  She reports a family history of heart disease stating that her mom has a defibrillator due to cardiomyopathy.  She denies any tobacco, alcohol or illicit drug use.                                                               Family History:Mom-CMO/ICD, HLD, HTN, DM II; Father-  of cancer  Social History: Denies any illicit drug, ETOH or tobacco use                                                                                                                                                                                                                                                                                                CARDIAC TESTING:    ECHO 9.29.21  RVSP 14 mmHg   ·  Left Ventricle: Normal left ventricle cavity size. Low normal (50-55%)   ejection fraction. Mild concentric hypertrophy observed.   · Left Atrium: Normal cavity size.   · Mitral Valve: Normal mitral valve structure. No mitral stenosis. Mild   mitral regurgitation. Tricuspid Valve: Normal tricuspid valve structure.   Trace tricuspid regurgitation. No pulmonary hypertension present. Aortic   Valve: The valve is tricuspid. No aortic stenosis. Trace aortic   regurgitation.   · Pericardium: No pericardial effusion.   Left Ventricle   Normal left ventricle cavity size. Low normal (50-55%) ejection fraction. Mild concentric hypertrophy observed.          Patient Active Problem List   Diagnosis    Asthma    HTN (hypertension)    Palpitations    Anxiety    Well adult exam    NSTEMI (non-ST elevated myocardial infarction)    HLD (hyperlipidemia)    Obesity    Need for vaccination    Abnormal CBC    Hypokalemia     History reviewed. No pertinent surgical history.  Social History     Socioeconomic History    Marital status: Single   Tobacco Use    Smoking status: Never     Passive exposure: Never    Smokeless tobacco: Never   Substance and Sexual Activity    Alcohol use: Never    Drug use: Not Currently     Types: Marijuana     Comment: states quit 4-5 months     Social Determinants of Health     Transportation Needs: No Transportation Needs (1/10/2024)    PRAPARE - Transportation     Lack of Transportation (Medical): No     Lack of Transportation (Non-Medical): No   Housing Stability: Low Risk  (1/10/2024)    Housing Stability Vital Sign     Unable to Pay for Housing in the Last Year: No     Number of Places Lived in the Last Year: 1     Unstable Housing in the Last Year: No        History reviewed. No pertinent family history.  Review of patient's allergies indicates:  No Known Allergies      ROS:  Review of Systems   Constitutional: Negative.    HENT: Negative.     Eyes: Negative.    Respiratory: Negative.  Negative for  "shortness of breath.    Cardiovascular:  Positive for palpitations.   Gastrointestinal: Negative.    Genitourinary: Negative.    Musculoskeletal: Negative.    Skin: Negative.    Neurological: Negative.    Endo/Heme/Allergies: Negative.    Psychiatric/Behavioral: Negative.                                                                                                                                                                                  Negative except as stated in the history of present illness. See HPI for details.    PHYSICAL EXAM:  Visit Vitals  /80 (BP Location: Left arm, Patient Position: Sitting, BP Method: X-Large (Automatic))   Pulse 63   Temp 98.8 °F (37.1 °C) (Oral)   Resp 18   Ht 5' 5" (1.651 m)   Wt 121.7 kg (268 lb 3.2 oz)   SpO2 95%   BMI 44.63 kg/m²       Physical Exam  HENT:      Head: Normocephalic.      Nose: Nose normal.   Eyes:      Pupils: Pupils are equal, round, and reactive to light.   Cardiovascular:      Rate and Rhythm: Normal rate and regular rhythm.   Pulmonary:      Effort: Pulmonary effort is normal.   Abdominal:      General: Abdomen is flat. Bowel sounds are normal.      Palpations: Abdomen is soft.   Musculoskeletal:         General: Normal range of motion.      Cervical back: Normal range of motion.   Skin:     General: Skin is warm.   Neurological:      General: No focal deficit present.      Mental Status: She is alert.   Psychiatric:         Mood and Affect: Mood normal.         Current Outpatient Medications   Medication Instructions    albuterol (PROVENTIL/VENTOLIN HFA) 90 mcg/actuation inhaler 2 puffs, Inhalation, Every 6 hours PRN    amLODIPine (NORVASC) 10 mg, Oral, Daily    atorvastatin (LIPITOR) 20 mg, Oral, Nightly    chlorthalidone (HYGROTEN) 50 mg, Oral, Daily    ergocalciferol (ERGOCALCIFEROL) 50,000 unit Cap Oral    FEROSUL 325 mg (65 mg iron) Tab tablet Oral    fluticasone-salmeterol 230-21 mcg/dose (ADVAIR HFA) 230-21 mcg/actuation HFAA inhaler 2 " puffs, Inhalation, 2 times daily, Controller    hydrALAZINE (APRESOLINE) 100 mg, Oral, 3 times daily    losartan (COZAAR) 100 mg, Oral, Daily    metoprolol succinate (TOPROL-XL) 50 mg, Oral, Daily    ondansetron (ZOFRAN-ODT) 4 mg, Oral, Every 6 hours PRN    sertraline (ZOLOFT) 50 MG tablet Take 1/2 tab po Q hs X 1 week then increase to 1 tab po Q hs.        All medications, laboratory studies, cardiac diagnostic imaging reviewed.     Lab Results   Component Value Date    LDL 91.00 02/19/2024    .00 12/08/2020    TRIG 42 02/19/2024    TRIG 89 12/08/2020    CREATININE 0.86 02/19/2024    MG 2.2 03/17/2019    K 3.3 (L) 02/19/2024        ASSESSMENT/PLAN:  Palpitations  - Reports previous palpitations that occurred intermittent over the past year with acute exacerbation whenever she was without her anxiety medications.  Symptoms have since resolved with resumption of medication in addition of metoprolol  - Obtain 48 Hour Holter Monitor to assess for any arrhythmias.  Patient does report a previous history of PVC  - Obtain Echocardiogram to assess LV function and check for any significant valvular or structural abnormalities  - Continue Metoprolol Succinate 50 mg daily   - Advised to avoid/decrease caffeine intake, stimulants and remain well hydrated.    Chest Heaviness- atypical   - Reports occasional chest heaviness that typically occurs in the morning.  Improves with inhaler. Unsure of if symptoms are related asthma   - Treadmill Stress Test to assess for ischemic etiology of symptoms.  May be anginal equivalent.  Has risk factors of HTN, HLD, obesity  - EKG today - SB, HR - 59, with no acute ischemic changes    HTN  - BP at goal   - Cotinue current medications amlodipine 10 mg, hydralazine 100 mg TID, losartan 100 mg, chlorthalidone 50 mg, metoprolol succinate 50 mg  - Advised on low salt diet  - Management per PCP    HLD  - LDL at goal   - Continue Atorvastatin 20 mg   - Advised on low-cholesterol diet and  increased exercise as tolerated      EKG  48 hour Holter monitor   Echocardiogram  Treadmill Stress Test   Follow up in Cardiology Clinic in 3 months or sooner pending results   Follow up with PCP as directed

## 2024-02-21 NOTE — PATIENT INSTRUCTIONS
EKG  48 hour Holter monitor   Echocardiogram  Treadmill Stress Test   Follow up in Cardiology Clinic in 3 months or sooner pending results   Follow up with PCP as directed

## 2024-02-23 ENCOUNTER — DOCUMENTATION ONLY (OUTPATIENT)
Dept: ADMINISTRATIVE | Facility: HOSPITAL | Age: 34
End: 2024-02-23
Payer: MEDICAID

## 2024-03-08 ENCOUNTER — HOSPITAL ENCOUNTER (OUTPATIENT)
Dept: CARDIOLOGY | Facility: HOSPITAL | Age: 34
Discharge: HOME | End: 2024-03-08
Attending: NURSE PRACTITIONER
Payer: MEDICAID

## 2024-03-08 VITALS
HEART RATE: 61 BPM | RESPIRATION RATE: 20 BRPM | DIASTOLIC BLOOD PRESSURE: 73 MMHG | HEIGHT: 65 IN | WEIGHT: 268.31 LBS | SYSTOLIC BLOOD PRESSURE: 131 MMHG | BODY MASS INDEX: 44.7 KG/M2

## 2024-03-08 DIAGNOSIS — R07.89 CHEST HEAVINESS: ICD-10-CM

## 2024-03-08 LAB
CV STRESS BASE HR: 62 BPM
DIASTOLIC BLOOD PRESSURE: 73 MMHG
OHS CV CPX 85 PERCENT MAX PREDICTED HEART RATE MALE: 158
OHS CV CPX ESTIMATED METS: 7
OHS CV CPX MAX PREDICTED HEART RATE: 186
OHS CV CPX PATIENT IS FEMALE: 1
OHS CV CPX PATIENT IS MALE: 0
OHS CV CPX PEAK DIASTOLIC BLOOD PRESSURE: 74 MMHG
OHS CV CPX PEAK HEAR RATE: 148 BPM
OHS CV CPX PEAK RATE PRESSURE PRODUCT: NORMAL
OHS CV CPX PEAK SYSTOLIC BLOOD PRESSURE: 202 MMHG
OHS CV CPX PERCENT MAX PREDICTED HEART RATE ACHIEVED: 84
OHS CV CPX RATE PRESSURE PRODUCT PRESENTING: 8122
STRESS ECHO POST EXERCISE DUR MIN: 6 MINUTES
STRESS ECHO POST EXERCISE DUR SEC: 0 SECONDS
SYSTOLIC BLOOD PRESSURE: 131 MMHG

## 2024-03-08 PROCEDURE — 93017 CV STRESS TEST TRACING ONLY: CPT

## 2024-03-11 LAB
OHS QRS DURATION: 106 MS
OHS QTC CALCULATION: 465 MS

## 2024-03-12 ENCOUNTER — TELEPHONE (OUTPATIENT)
Dept: CARDIOLOGY | Facility: CLINIC | Age: 34
End: 2024-03-12
Payer: MEDICAID

## 2024-03-12 ENCOUNTER — PATIENT MESSAGE (OUTPATIENT)
Dept: INTERNAL MEDICINE | Facility: CLINIC | Age: 34
End: 2024-03-12
Payer: MEDICAID

## 2024-03-12 DIAGNOSIS — R00.2 PALPITATIONS: ICD-10-CM

## 2024-03-12 DIAGNOSIS — R94.39 ABNORMAL STRESS ELECTROCARDIOGRAM TEST USING TREADMILL: ICD-10-CM

## 2024-03-12 DIAGNOSIS — R07.89 CHEST HEAVINESS: Primary | ICD-10-CM

## 2024-03-12 NOTE — TELEPHONE ENCOUNTER
Treadmill stress test non-diagnostic due to the patient not reaching target heart rate.  Will order DSE for further evaluation of ischemic symptoms.

## 2024-03-26 ENCOUNTER — PATIENT MESSAGE (OUTPATIENT)
Dept: INTERNAL MEDICINE | Facility: CLINIC | Age: 34
End: 2024-03-26
Payer: MEDICAID

## 2024-03-27 ENCOUNTER — HOSPITAL ENCOUNTER (EMERGENCY)
Facility: HOSPITAL | Age: 34
Discharge: HOME | End: 2024-03-27
Attending: EMERGENCY MEDICINE
Payer: MEDICAID

## 2024-03-27 VITALS
HEIGHT: 66 IN | RESPIRATION RATE: 16 BRPM | TEMPERATURE: 98 F | HEART RATE: 88 BPM | WEIGHT: 264.56 LBS | SYSTOLIC BLOOD PRESSURE: 142 MMHG | DIASTOLIC BLOOD PRESSURE: 81 MMHG | BODY MASS INDEX: 42.52 KG/M2 | OXYGEN SATURATION: 100 %

## 2024-03-27 DIAGNOSIS — K08.89 PAIN, DENTAL: Primary | ICD-10-CM

## 2024-03-27 LAB
B-HCG UR QL: NEGATIVE
CTP QC/QA: YES

## 2024-03-27 PROCEDURE — 63600175 PHARM REV CODE 636 W HCPCS: Performed by: PHYSICIAN ASSISTANT

## 2024-03-27 PROCEDURE — 99284 EMERGENCY DEPT VISIT MOD MDM: CPT | Mod: 25

## 2024-03-27 PROCEDURE — 96372 THER/PROPH/DIAG INJ SC/IM: CPT | Performed by: PHYSICIAN ASSISTANT

## 2024-03-27 PROCEDURE — 81025 URINE PREGNANCY TEST: CPT | Performed by: PHYSICIAN ASSISTANT

## 2024-03-27 RX ORDER — KETOROLAC TROMETHAMINE 30 MG/ML
30 INJECTION, SOLUTION INTRAMUSCULAR; INTRAVENOUS
Status: COMPLETED | OUTPATIENT
Start: 2024-03-27 | End: 2024-03-27

## 2024-03-27 RX ORDER — DICLOFENAC SODIUM 75 MG/1
75 TABLET, DELAYED RELEASE ORAL 2 TIMES DAILY
Qty: 14 TABLET | Refills: 0 | Status: SHIPPED | OUTPATIENT
Start: 2024-03-27 | End: 2024-04-03

## 2024-03-27 RX ORDER — AMOXICILLIN 875 MG/1
875 TABLET, FILM COATED ORAL 2 TIMES DAILY
Qty: 14 TABLET | Refills: 0 | Status: SHIPPED | OUTPATIENT
Start: 2024-03-27 | End: 2024-05-21 | Stop reason: ALTCHOICE

## 2024-03-27 RX ORDER — HYDROCODONE BITARTRATE AND ACETAMINOPHEN 5; 325 MG/1; MG/1
1 TABLET ORAL EVERY 8 HOURS PRN
Qty: 9 TABLET | Refills: 0 | Status: SHIPPED | OUTPATIENT
Start: 2024-03-27 | End: 2024-03-30

## 2024-03-27 RX ADMIN — KETOROLAC TROMETHAMINE 30 MG: 30 INJECTION, SOLUTION INTRAMUSCULAR; INTRAVENOUS at 10:03

## 2024-03-27 NOTE — ED PROVIDER NOTES
Encounter Date: 3/27/2024       History     Chief Complaint   Patient presents with    Dental Pain     Pt reports left lower molar pain x 1 month. No new or worsening symptoms.      Elian Mann is a 33 y.o. female who presents to the ED with complaints of left sided lower molar pain that started 1 month(s) ago.       The history is provided by the patient. No  was used.     Review of patient's allergies indicates:  No Known Allergies  Past Medical History:   Diagnosis Date    Asthma     Hypertension      No past surgical history on file.  No family history on file.  Social History     Tobacco Use    Smoking status: Never     Passive exposure: Never    Smokeless tobacco: Never   Substance Use Topics    Alcohol use: Never    Drug use: Not Currently     Types: Marijuana     Comment: states quit 4-5 months     Review of Systems   Constitutional:  Negative for chills, fatigue and fever.   HENT:  Positive for dental problem. Negative for congestion, ear pain, sinus pain and sore throat.    Eyes:  Negative for pain.   Respiratory:  Negative for cough, chest tightness and shortness of breath.    Cardiovascular:  Negative for chest pain.   Gastrointestinal:  Negative for abdominal pain, constipation, diarrhea, nausea and vomiting.   Genitourinary:  Negative for dysuria.   Musculoskeletal:  Negative for back pain and joint swelling.   Skin:  Negative for color change and rash.   Neurological:  Negative for dizziness, weakness and headaches.   Psychiatric/Behavioral:  Negative for behavioral problems and confusion.        Physical Exam     Initial Vitals [03/27/24 1012]   BP Pulse Resp Temp SpO2   132/84 88 16 98.4 °F (36.9 °C) 100 %      MAP       --         Physical Exam    Nursing note and vitals reviewed.  Constitutional: She appears well-developed and well-nourished.   HENT:   Head: Normocephalic and atraumatic.   Nose: Nose normal.   Mouth/Throat:       Eyes: EOM are normal. Pupils are equal,  round, and reactive to light.   Neck: Neck supple. No thyromegaly present. No JVD present.   Normal range of motion.  Cardiovascular:  Normal rate, regular rhythm, normal heart sounds and intact distal pulses.           No murmur heard.  Pulmonary/Chest: Breath sounds normal. No respiratory distress. She has no wheezes. She has no rhonchi. She has no rales. She exhibits no tenderness.   Abdominal: Abdomen is soft. Bowel sounds are normal. She exhibits no distension. There is no abdominal tenderness. There is no rebound and no guarding.   Musculoskeletal:         General: No tenderness or edema. Normal range of motion.      Cervical back: Normal range of motion and neck supple.     Lymphadenopathy:     She has no cervical adenopathy.   Neurological: She is alert and oriented to person, place, and time.   Skin: Skin is warm and dry. Capillary refill takes less than 2 seconds.   Psychiatric: She has a normal mood and affect. Thought content normal.         ED Course   Procedures  Labs Reviewed   POCT URINE PREGNANCY          Imaging Results    None          Medications   ketorolac injection 30 mg (30 mg Intramuscular Given 3/27/24 1054)     Medical Decision Making  DDX:     Elian Mann is a 33 y.o. female who presents to the ED with complaints of left sided lower molar pain that started 1 month(s) ago. No abscess on exam. Will cover with antibiotic for gum erythema and gave a dental resource handout to patient. She currently has an appt scheduled in June with her dentist. Strict return precautions given, stable for discharge.     Amount and/or Complexity of Data Reviewed  Labs: ordered.                                      Clinical Impression:  Final diagnoses:  [K08.89] Pain, dental (Primary)          ED Disposition Condition    Discharge Stable          ED Prescriptions       Medication Sig Dispense Start Date End Date Auth. Provider    amoxicillin (AMOXIL) 875 MG tablet Take 1 tablet (875 mg total) by mouth 2  (two) times daily. 14 tablet 3/27/2024 -- Nicolette Zamudio PA-C    diclofenac (VOLTAREN) 75 MG EC tablet Take 1 tablet (75 mg total) by mouth 2 (two) times daily. for 7 days 14 tablet 3/27/2024 4/3/2024 Nicolette Zamudio PA-C    HYDROcodone-acetaminophen (NORCO) 5-325 mg per tablet Take 1 tablet by mouth every 8 (eight) hours as needed for Pain. 9 tablet 3/27/2024 3/30/2024 Nicolette Zamudio PA-C          Follow-up Information       Follow up With Specialties Details Why Contact Info    Coco Mcneill[facility]   [address] [facility] [address]  [phone]      [facility] Emergency Medicine In 3 days As needed, If symptoms worsen [address] [facility] [phone]             Nicolette Zamudio PA-C  03/27/24 1058

## 2024-03-27 NOTE — DISCHARGE INSTRUCTIONS
Please follow up with the dentist. Take medication as prescribed.    It is important that you follow up with your primary care provider or specialist if indicated for further evaluation, workup, and treatment as necessary. The exam and treatment you received in Emergency Department was for an urgent problem and NOT INTENDED AS COMPLETE CARE. It is important that you FOLLOW UP with a doctor for ongoing care. If your symptoms become WORSE or you DO NOT IMPROVE and you are unable to reach your health care provider, you should RETURN to the Emergency Department.

## 2024-04-11 ENCOUNTER — TELEPHONE (OUTPATIENT)
Dept: INTERNAL MEDICINE | Facility: CLINIC | Age: 34
End: 2024-04-11
Payer: MEDICAID

## 2024-04-11 NOTE — TELEPHONE ENCOUNTER
----- Message from Gilbert Short sent at 4/11/2024  2:35 PM CDT -----  Type:  Needs Medical Advice    Who Called: pt    Pharmacy name and phone #:  [id number] - [place] - [address] AT [facility] [address] [facility] [address] [facility] [address] [facility]    Best Call Back Number:  [phone]  Additional Information: pt has abscess on gum , was told by dentist to schedule with provider, to request antibiotic , pt asked for a call to discuss / schedule sooner appt ( next avail 05/13/24)

## 2024-04-11 NOTE — TELEPHONE ENCOUNTER
Left vml notifying pt she will have to be seen for any antibiotics to be prescribed. Pt advised to call back to scheduled next available. We do not have anything sooner than 5/13 and can be added to waitlist.

## 2024-04-15 PROBLEM — Z00.00 WELL ADULT EXAM: Status: RESOLVED | Noted: 2024-01-10 | Resolved: 2024-04-15

## 2024-04-15 PROBLEM — I21.4 NSTEMI (NON-ST ELEVATED MYOCARDIAL INFARCTION): Status: RESOLVED | Noted: 2024-01-10 | Resolved: 2024-04-15

## 2024-05-20 ENCOUNTER — PATIENT MESSAGE (OUTPATIENT)
Dept: INTERNAL MEDICINE | Facility: CLINIC | Age: 34
End: 2024-05-20

## 2024-05-20 ENCOUNTER — LAB VISIT (OUTPATIENT)
Dept: LAB | Facility: HOSPITAL | Age: 34
End: 2024-05-20
Attending: NURSE PRACTITIONER
Payer: MEDICAID

## 2024-05-20 DIAGNOSIS — I10 PRIMARY HYPERTENSION: ICD-10-CM

## 2024-05-20 DIAGNOSIS — R79.89 ABNORMAL CBC: ICD-10-CM

## 2024-05-20 LAB
ANION GAP SERPL CALC-SCNC: 8 MEQ/L
BACTERIA #/AREA URNS AUTO: ABNORMAL /HPF
BASOPHILS # BLD AUTO: 0.05 X10(3)/MCL
BASOPHILS NFR BLD AUTO: 0.7 %
BILIRUB UR QL STRIP.AUTO: NEGATIVE
BUN SERPL-MCNC: 14.9 MG/DL (ref 7–18.7)
CALCIUM SERPL-MCNC: 9.6 MG/DL (ref 8.4–10.2)
CHLORIDE SERPL-SCNC: 105 MMOL/L (ref 98–107)
CLARITY UR: CLEAR
CO2 SERPL-SCNC: 28 MMOL/L (ref 22–29)
COLOR UR AUTO: ABNORMAL
CREAT SERPL-MCNC: 0.86 MG/DL (ref 0.55–1.02)
CREAT/UREA NIT SERPL: 17
EOSINOPHIL # BLD AUTO: 0.16 X10(3)/MCL (ref 0–0.9)
EOSINOPHIL NFR BLD AUTO: 2.3 %
ERYTHROCYTE [DISTWIDTH] IN BLOOD BY AUTOMATED COUNT: 12.4 % (ref 11.5–17)
EST. AVERAGE GLUCOSE BLD GHB EST-MCNC: 111.2 MG/DL
GFR SERPLBLD CREATININE-BSD FMLA CKD-EPI: >60 ML/MIN/1.73/M2
GLUCOSE SERPL-MCNC: 98 MG/DL (ref 74–100)
GLUCOSE UR QL STRIP: NORMAL
HBA1C MFR BLD: 5.5 %
HCT VFR BLD AUTO: 35.7 % (ref 37–47)
HGB BLD-MCNC: 11.7 G/DL (ref 12–16)
HGB UR QL STRIP: NEGATIVE
HYALINE CASTS #/AREA URNS LPF: ABNORMAL /LPF
IMM GRANULOCYTES # BLD AUTO: 0.02 X10(3)/MCL (ref 0–0.04)
IMM GRANULOCYTES NFR BLD AUTO: 0.3 %
KETONES UR QL STRIP: NEGATIVE
LEUKOCYTE ESTERASE UR QL STRIP: NEGATIVE
LYMPHOCYTES # BLD AUTO: 2.4 X10(3)/MCL (ref 0.6–4.6)
LYMPHOCYTES NFR BLD AUTO: 35.2 %
MCH RBC QN AUTO: 31.8 PG (ref 27–31)
MCHC RBC AUTO-ENTMCNC: 32.8 G/DL (ref 33–36)
MCV RBC AUTO: 97 FL (ref 80–94)
MONOCYTES # BLD AUTO: 0.65 X10(3)/MCL (ref 0.1–1.3)
MONOCYTES NFR BLD AUTO: 9.5 %
NEUTROPHILS # BLD AUTO: 3.53 X10(3)/MCL (ref 2.1–9.2)
NEUTROPHILS NFR BLD AUTO: 52 %
NITRITE UR QL STRIP: NEGATIVE
NRBC BLD AUTO-RTO: 0 %
PH UR STRIP: 7 [PH]
PLATELET # BLD AUTO: 287 X10(3)/MCL (ref 130–400)
PMV BLD AUTO: 11.1 FL (ref 7.4–10.4)
POTASSIUM SERPL-SCNC: 3.1 MMOL/L (ref 3.5–5.1)
PROT UR QL STRIP: NEGATIVE
RBC # BLD AUTO: 3.68 X10(6)/MCL (ref 4.2–5.4)
RBC #/AREA URNS AUTO: ABNORMAL /HPF
SODIUM SERPL-SCNC: 141 MMOL/L (ref 136–145)
SP GR UR STRIP.AUTO: 1.02 (ref 1–1.03)
SQUAMOUS #/AREA URNS LPF: ABNORMAL /HPF
UROBILINOGEN UR STRIP-ACNC: NORMAL
WBC # SPEC AUTO: 6.81 X10(3)/MCL (ref 4.5–11.5)
WBC #/AREA URNS AUTO: ABNORMAL /HPF

## 2024-05-20 PROCEDURE — 81001 URINALYSIS AUTO W/SCOPE: CPT

## 2024-05-20 PROCEDURE — 36415 COLL VENOUS BLD VENIPUNCTURE: CPT

## 2024-05-20 PROCEDURE — 83036 HEMOGLOBIN GLYCOSYLATED A1C: CPT

## 2024-05-20 PROCEDURE — 85025 COMPLETE CBC W/AUTO DIFF WBC: CPT

## 2024-05-20 PROCEDURE — 80048 BASIC METABOLIC PNL TOTAL CA: CPT

## 2024-05-21 ENCOUNTER — OFFICE VISIT (OUTPATIENT)
Dept: INTERNAL MEDICINE | Facility: CLINIC | Age: 34
End: 2024-05-21
Payer: MEDICAID

## 2024-05-21 VITALS
BODY MASS INDEX: 45.64 KG/M2 | DIASTOLIC BLOOD PRESSURE: 82 MMHG | HEART RATE: 60 BPM | WEIGHT: 284 LBS | RESPIRATION RATE: 18 BRPM | SYSTOLIC BLOOD PRESSURE: 129 MMHG | TEMPERATURE: 98 F | HEIGHT: 66 IN

## 2024-05-21 DIAGNOSIS — R79.89 ABNORMAL CBC: ICD-10-CM

## 2024-05-21 DIAGNOSIS — F41.9 ANXIETY: ICD-10-CM

## 2024-05-21 DIAGNOSIS — E87.6 HYPOKALEMIA: ICD-10-CM

## 2024-05-21 DIAGNOSIS — E66.9 OBESITY, UNSPECIFIED CLASSIFICATION, UNSPECIFIED OBESITY TYPE, UNSPECIFIED WHETHER SERIOUS COMORBIDITY PRESENT: ICD-10-CM

## 2024-05-21 DIAGNOSIS — R00.2 PALPITATIONS: ICD-10-CM

## 2024-05-21 DIAGNOSIS — Z00.00 WELL ADULT EXAM: ICD-10-CM

## 2024-05-21 DIAGNOSIS — B37.31 VAGINAL CANDIDIASIS: ICD-10-CM

## 2024-05-21 DIAGNOSIS — I10 PRIMARY HYPERTENSION: Primary | ICD-10-CM

## 2024-05-21 DIAGNOSIS — I21.4 NSTEMI (NON-ST ELEVATED MYOCARDIAL INFARCTION): ICD-10-CM

## 2024-05-21 DIAGNOSIS — E78.2 MIXED HYPERLIPIDEMIA: ICD-10-CM

## 2024-05-21 DIAGNOSIS — J45.909 ASTHMA, UNSPECIFIED ASTHMA SEVERITY, UNSPECIFIED WHETHER COMPLICATED, UNSPECIFIED WHETHER PERSISTENT: ICD-10-CM

## 2024-05-21 PROCEDURE — 3008F BODY MASS INDEX DOCD: CPT | Mod: CPTII,,, | Performed by: NURSE PRACTITIONER

## 2024-05-21 PROCEDURE — 3044F HG A1C LEVEL LT 7.0%: CPT | Mod: CPTII,,, | Performed by: NURSE PRACTITIONER

## 2024-05-21 PROCEDURE — 99214 OFFICE O/P EST MOD 30 MIN: CPT | Mod: PBBFAC | Performed by: NURSE PRACTITIONER

## 2024-05-21 PROCEDURE — 1159F MED LIST DOCD IN RCRD: CPT | Mod: CPTII,,, | Performed by: NURSE PRACTITIONER

## 2024-05-21 PROCEDURE — 4010F ACE/ARB THERAPY RXD/TAKEN: CPT | Mod: CPTII,,, | Performed by: NURSE PRACTITIONER

## 2024-05-21 PROCEDURE — 99214 OFFICE O/P EST MOD 30 MIN: CPT | Mod: S$PBB,,, | Performed by: NURSE PRACTITIONER

## 2024-05-21 PROCEDURE — 3074F SYST BP LT 130 MM HG: CPT | Mod: CPTII,,, | Performed by: NURSE PRACTITIONER

## 2024-05-21 PROCEDURE — 3079F DIAST BP 80-89 MM HG: CPT | Mod: CPTII,,, | Performed by: NURSE PRACTITIONER

## 2024-05-21 RX ORDER — ALBUTEROL SULFATE 90 UG/1
2 AEROSOL, METERED RESPIRATORY (INHALATION) EVERY 6 HOURS PRN
Qty: 18 G | Refills: 6 | Status: SHIPPED | OUTPATIENT
Start: 2024-05-21

## 2024-05-21 RX ORDER — METOPROLOL SUCCINATE 50 MG/1
50 TABLET, EXTENDED RELEASE ORAL DAILY
Qty: 90 TABLET | Refills: 1 | Status: SHIPPED | OUTPATIENT
Start: 2024-05-21 | End: 2025-05-21

## 2024-05-21 RX ORDER — POTASSIUM CHLORIDE 20 MEQ/1
20 TABLET, EXTENDED RELEASE ORAL DAILY
Qty: 30 TABLET | Refills: 3 | Status: SHIPPED | OUTPATIENT
Start: 2024-05-21

## 2024-05-21 RX ORDER — SERTRALINE HYDROCHLORIDE 50 MG/1
TABLET, FILM COATED ORAL
Qty: 90 TABLET | Refills: 1 | Status: SHIPPED | OUTPATIENT
Start: 2024-05-21

## 2024-05-21 RX ORDER — AMLODIPINE BESYLATE 10 MG/1
10 TABLET ORAL DAILY
Qty: 90 TABLET | Refills: 1 | Status: SHIPPED | OUTPATIENT
Start: 2024-05-21

## 2024-05-21 RX ORDER — ATORVASTATIN CALCIUM 20 MG/1
20 TABLET, FILM COATED ORAL NIGHTLY
Qty: 90 TABLET | Refills: 1 | Status: SHIPPED | OUTPATIENT
Start: 2024-05-21

## 2024-05-21 RX ORDER — FLUCONAZOLE 150 MG/1
150 TABLET ORAL DAILY
Qty: 1 TABLET | Refills: 0 | Status: SHIPPED | OUTPATIENT
Start: 2024-05-21 | End: 2024-05-22

## 2024-05-21 RX ORDER — FLUTICASONE PROPIONATE AND SALMETEROL XINAFOATE 230; 21 UG/1; UG/1
2 AEROSOL, METERED RESPIRATORY (INHALATION) 2 TIMES DAILY
Qty: 12 G | Refills: 3 | Status: SHIPPED | OUTPATIENT
Start: 2024-05-21 | End: 2025-05-21

## 2024-05-21 RX ORDER — LOSARTAN POTASSIUM 100 MG/1
100 TABLET ORAL DAILY
Qty: 90 TABLET | Refills: 1 | Status: SHIPPED | OUTPATIENT
Start: 2024-05-21

## 2024-05-21 RX ORDER — CHLORTHALIDONE 50 MG/1
50 TABLET ORAL DAILY
Qty: 90 TABLET | Refills: 1 | Status: SHIPPED | OUTPATIENT
Start: 2024-05-21

## 2024-05-21 RX ORDER — HYDRALAZINE HYDROCHLORIDE 100 MG/1
100 TABLET, FILM COATED ORAL 3 TIMES DAILY
Qty: 180 TABLET | Refills: 1 | Status: SHIPPED | OUTPATIENT
Start: 2024-05-21

## 2024-05-21 NOTE — PROGRESS NOTES
Patient ID: [id number]     Chief Complaint: LAB RESULTS        HPI:     JASON Mann is a 33 y.o. female here today for a follow up.           Immunizations:   Immunization History   Administered Date(s) Administered    Influenza - Quadrivalent - PF *Preferred* (6 months and older) 01/10/2024        -------------------------------------    Asthma    Hypertension        History reviewed. No pertinent surgical history.    Review of patient's allergies indicates:  No Known Allergies    Current Outpatient Medications   Medication Instructions    albuterol (PROVENTIL/VENTOLIN HFA) 90 mcg/actuation inhaler 2 puffs, Inhalation, Every 6 hours PRN    amLODIPine (NORVASC) 10 mg, Oral, Daily    amoxicillin (AMOXIL) 875 mg, Oral, 2 times daily    atorvastatin (LIPITOR) 20 mg, Oral, Nightly    chlorthalidone (HYGROTEN) 50 mg, Oral, Daily    ergocalciferol (ERGOCALCIFEROL) 50,000 unit Cap Oral    FEROSUL 325 mg (65 mg iron) Tab tablet Oral    fluticasone-salmeterol 230-21 mcg/dose (ADVAIR HFA) 230-21 mcg/actuation HFAA inhaler 2 puffs, Inhalation, 2 times daily, Controller    hydrALAZINE (APRESOLINE) 100 mg, Oral, 3 times daily    losartan (COZAAR) 100 mg, Oral, Daily    metoprolol succinate (TOPROL-XL) 50 mg, Oral, Daily    ondansetron (ZOFRAN-ODT) 4 mg, Oral, Every 6 hours PRN    sertraline (ZOLOFT) 50 MG tablet Take 1/2 tab po Q hs X 1 week then increase to 1 tab po Q hs.       Social History     Socioeconomic History    Marital status: Single   Tobacco Use    Smoking status: Never     Passive exposure: Never    Smokeless tobacco: Never   Substance and Sexual Activity    Alcohol use: Never    Drug use: Not Currently     Types: Marijuana     Comment: states quit 4-5 months     Social Determinants of Health     Financial Resource Strain: Low Risk  (5/21/2024)    Overall Financial Resource Strain (CARDIA)     Difficulty of Paying Living Expenses: Not hard at all   Food Insecurity: No Food Insecurity (5/21/2024)     Hunger Vital Sign     Worried About Running Out of Food in the Last Year: Never true     Ran Out of Food in the Last Year: Never true   Transportation Needs: No Transportation Needs (5/13/2024)    PRAPARE - Transportation     Lack of Transportation (Medical): No     Lack of Transportation (Non-Medical): No   Physical Activity: Inactive (5/21/2024)    Exercise Vital Sign     Days of Exercise per Week: 0 days     Minutes of Exercise per Session: 0 min   Stress: No Stress Concern Present (5/21/2024)    [employer] - Occupational Stress Questionnaire     Feeling of Stress : Not at all   Housing Stability: Low Risk  (1/10/2024)    Housing Stability Vital Sign     Unable to Pay for Housing in the Last Year: No     Number of Places Lived in the Last Year: 1     Unstable Housing in the Last Year: No        No family history on file.     Patient Care Team:  Coco Mcneill FNP as PCP - General (Family Medicine)     Subjective:     Review of Systems     See HPI for details    Constitutional: Denies Change in appetite. Denies Chills. Denies Fever. Denies Night sweats.  Eye: Denies Blurred vision. Denies Discharge. Denies Eye pain.  ENT: Denies Decreased hearing. Denies Sore throat. Denies Swollen glands.  Respiratory: Denies Cough. Denies Shortness of breath. Denies Shortness of breath with exertion. Denies Wheezing.  Cardiovascular: DeniesChest pain at rest. Denies Chest pain with exertion. Denies Irregular heartbeat. Denies Palpitations. Denies Edema.  Gastrointestinal: Denies Abdominal pain. DeniesDiarrhea. Denies Nausea. Denies Vomiting. Denies Hematemesis or Hematochezia.  Genitourinary: Denies Dysuria. Denies Urinary frequency. Denies Urinary urgency. Denies Blood in urine.  Endocrine: Denies Cold intolerance. Denies Excessive thirst. Denies Heat intolerance. Denies Weight loss. Denies Weight gain.  Musculoskeletal: Denies Painful joints. Denies Weakness.  Integumentary: Denies Rash.  "Denies Itching. Denies Dry skin.  Neurologic: Denies Dizziness. Denies Fainting. Denies Headache.  Psychiatric: Denies Depression. Denies Anxiety. Denies Suicidal/Homicidal ideations.    All Other ROS: Negative except as stated in HPI.       Objective:     Visit Vitals  /82 (BP Location: Right arm, Patient Position: Sitting, BP Method: Large (Automatic))   Pulse 60   Temp 98.4 °F (36.9 °C) (Oral)   Resp 18   Ht 5' 6" (1.676 m)   Wt 128.8 kg (284 lb)   BMI 45.84 kg/m²       Physical Exam    General: Alert and oriented, No acute distress.  Head: Normocephalic, Atraumatic.  Eye: Pupils are equal, round and reactive to light, Extraocular movements are intact, Sclera non-icteric.  Ears/Nose/Throat: Normal, Mucosa moist,Clear.  Neck/Thyroid: Supple, Non-tender, No carotid bruit, No lymphadenopathy, No JVD, Full range of motion.  Respiratory: Clear to auscultation bilaterally; No wheezes, rales or rhonchi,Non-labored respirations, Symmetrical chest wall expansion.  Cardiovascular: Regular rate and rhythm, S1/S2 normal, No murmurs, rubs or gallops.  Gastrointestinal: Soft, Non-tender, Non-distended, Normal bowel sounds, No palpable organomegaly.  Musculoskeletal: Normal range of motion.  Integumentary: Warm, Dry, Intact, No suspicious lesions or rashes.  Extremities: No clubbing, cyanosis or edema  Neurologic: No focal deficits, Cranial Nerves II-XII are grossly intact, Motor strength normal upper and lower extremities, Sensory exam intact.  Psychiatric: Normal interaction, Coherent speech, Euthymic mood, Appropriate affect       Labs Reviewed:     Chemistry:  Lab Results   Component Value Date     05/20/2024     09/28/2021    K 3.1 (L) 05/20/2024    K 4.0 09/28/2021    CHLORIDE 105 05/20/2024    BUN 14.9 05/20/2024    BUN 13 09/28/2021    CREATININE 0.86 05/20/2024    CREATININE 1.31 (H) 09/28/2021    EGFRNORACEVR >60 05/20/2024    GLUCOSE 98 05/20/2024    CALCIUM 9.6 05/20/2024    CALCIUM 9.2 09/28/2021 "    ALKPHOS 71 02/19/2024    LABPROT 7.6 02/19/2024    ALBUMIN 3.5 02/19/2024    BILIDIR 0.2 07/22/2020    IBILI 0.3 07/22/2020    AST 20 02/19/2024    ALT 17 02/19/2024    MG 2.2 03/17/2019    PHOS 3.9 03/17/2019        Lab Results   Component Value Date    HGBA1C 5.5 05/20/2024        Hematology:  Lab Results   Component Value Date    WBC 6.81 05/20/2024    HGB 11.7 (L) 05/20/2024    HCT 35.7 (L) 05/20/2024     05/20/2024       Lipid Panel:  Lab Results   Component Value Date    CHOL 161 02/19/2024    HDL 62 (H) 02/19/2024    LDL 91.00 02/19/2024    TRIG 42 02/19/2024    TOTALCHOLEST 3 02/19/2024        Urine:  Lab Results   Component Value Date    COLORUA Light-Yellow 05/20/2024    APPEARANCEUA Clear 05/20/2024    SGUA 1.017 05/20/2024    PHUA 7.0 05/20/2024    PROTEINUA Negative 05/20/2024    GLUCOSEUA Normal 05/20/2024    KETONESUA Negative 05/20/2024    BLOODUA Negative 05/20/2024    NITRITESUA Negative 05/20/2024    LEUKOCYTESUR Negative 05/20/2024    RBCUA 0-5 05/20/2024    WBCUA 0-5 05/20/2024    BACTERIA None Seen 05/20/2024    SQEPUA Few (A) 05/20/2024    HYALINECASTS None Seen 05/20/2024    CREATRANDUR 38.0 03/18/2019    PROTEINURINE <5.0 03/18/2019        Assessment:       ICD-10-CM ICD-9-CM   1. Primary hypertension  I10 401.9   2. Asthma, unspecified asthma severity, unspecified whether complicated, unspecified whether persistent  J45.909 493.90   3. Palpitations  R00.2 785.1   4. Anxiety  F41.9 300.00   5. Well adult exam  Z00.00 V70.0   6. NSTEMI (non-ST elevated myocardial infarction)  I21.4 410.70   7. Mixed hyperlipidemia  E78.2 272.2   8. Obesity, unspecified classification, unspecified obesity type, unspecified whether serious comorbidity present  E66.9 278.00   9. Abnormal CBC  R79.89 790.6   10. Hypokalemia  E87.6 276.8        Plan:     1. Primary hypertension   Low fat low salt diet and exercise. Cont Metoprolol, Amlodipine, Chlorthalidone, Hydralazine, Losartan as prescribed.     2.  Asthma, unspecified asthma severity, unspecified whether complicated, unspecified whether persistent  Avoid triggers. Cont Albuterol and Advair HFA as prescribed. PT NS PFT 2-7-24. CXR 1-10-24 WNL. PFT in 3 months.    3. Palpitations  Keep Cardio cl appt. Pt denies further episodes of palpitations since changing med to Metoprolol.     4. Anxiety  Denies SI/HI. Cont Sertraline as prescribed.     5. Well adult exam  Labs in 3 months. [facility]- pt states she missed appt and she will call to schedule appt.     6. NSTEMI (non-ST elevated myocardial infarction)  Keep Cardio cl appt     7. Mixed hyperlipidemia  Low fat diet and exercise. Cont Atorvastatin as prescribed.     8. Obesity, unspecified classification, unspecified obesity type, unspecified whether serious comorbidity present  Encouraged low fat diet and exercise. Education provided.     9. Abnormal CBC  CBC stable. Will repeat CBC in 6 months.    10. Hypokalemia  Potassium 3.1. Will start on Potassium Chloride 20 mEq 1 tab po daily. CMP in 3 months.         Follow up in about 3 months (around 8/21/2024) for with labs 1 week prior to appt. . In addition to their scheduled follow up, the patient has also been instructed to follow up on as needed basis.     Future Appointments   Date Time Provider Department Center   5/28/2024 11:15 AM Rukhsana Malone FNP [facility]

## 2024-05-29 NOTE — PROGRESS NOTES
CHIEF COMPLAINT:   Chief Complaint   Patient presents with    Follow-up     Denies any cardiac problems                                                  HPI:  Elian Mann 33 y.o. female with PMH HTN, HLD, asthma, obesity who presents to cardiology clinic for routine follow up and results of testing.  Last seen as initial referral for palpitations.  Latest Echocardiogram obtained in 2021 revealed preserved EF of 50-55% and mild mitral regurgitation.  Due to previous complaints of occasional chest heaviness, the patient completed a Treadmill stress test on 3.8.24 that was nondiagnostic due to the patient not reaching target heart rate.  A subsequent Dobutamine Stress Echocardiogram was ordered.  However the patient did not completed testing, nor did she complete her 48 hour Holter monitor due to anxiety regarding the testing.    Patient presents to clinic today reporting that she feels great.  She reports resolution of her previous symptoms with resumption of metoprolol.  She denies any chest pain, palpitations, orthopnea, PND, peripheral edema, claudication, lightheadedness, dizziness or syncope.  However, she does endorse occasional episodes of shortness of breath with over exertion or heavier housework but she attributes this to her asthma.  The patient states that she has started ambulating twice a week with her neighbor in efforts to increase her physical activity.  She denies experiencing any ischemic symptoms with this activity.  She also states that she has significantly decreased her caffeine intake.  She endorses compliance with her current medications and is currently tolerating without any issues.    Family History:Mom-CMO/ICD, HLD, HTN, DM II; Father-  of cancer  Social History: Denies any illicit drug, ETOH or tobacco use                                                                                                                                                                                                                                                                                                 CARDIAC TESTING:    ECHO 9.29.21  RVSP 14 mmHg   · Left Ventricle: Normal left ventricle cavity size. Low normal (50-55%)   ejection fraction. Mild concentric hypertrophy observed.   · Left Atrium: Normal cavity size.   · Mitral Valve: Normal mitral valve structure. No mitral stenosis. Mild   mitral regurgitation. Tricuspid Valve: Normal tricuspid valve structure.   Trace tricuspid regurgitation. No pulmonary hypertension present. Aortic   Valve: The valve is tricuspid. No aortic stenosis. Trace aortic   regurgitation.   · Pericardium: No pericardial effusion.   Left Ventricle   Normal left ventricle cavity size. Low normal (50-55%) ejection fraction. Mild concentric hypertrophy observed.          Patient Active Problem List   Diagnosis    Asthma    HTN (hypertension)    Palpitations    Anxiety    Well adult exam    NSTEMI (non-ST elevated myocardial infarction)    HLD (hyperlipidemia)    Obesity    Need for vaccination    Abnormal CBC    Hypokalemia     History reviewed. No pertinent surgical history.  Social History     Socioeconomic History    Marital status: Single   Tobacco Use    Smoking status: Never     Passive exposure: Never    Smokeless tobacco: Never   Substance and Sexual Activity    Alcohol use: Never    Drug use: Not Currently     Types: Marijuana     Comment: states quit 4-5 months     Social Determinants of Health     Financial Resource Strain: Low Risk  (5/21/2024)    Overall Financial Resource Strain (CARDIA)     Difficulty of Paying Living Expenses: Not hard at all   Food Insecurity: No Food Insecurity (5/21/2024)    Hunger Vital Sign     Worried About Running Out of Food in the Last Year: Never true     Ran Out of Food in the Last Year: Never true   Transportation Needs: No Transportation Needs (5/13/2024)    PRAPARE - Transportation     Lack of Transportation (Medical):  "No     Lack of Transportation (Non-Medical): No   Physical Activity: Inactive (5/21/2024)    Exercise Vital Sign     Days of Exercise per Week: 0 days     Minutes of Exercise per Session: 0 min   Stress: No Stress Concern Present (5/21/2024)    [place] [employer] - Occupational Stress Questionnaire     Feeling of Stress : Not at all   Housing Stability: Low Risk  (1/10/2024)    Housing Stability Vital Sign     Unable to Pay for Housing in the Last Year: No     Number of Places Lived in the Last Year: 1     Unstable Housing in the Last Year: No        No family history on file.  Review of patient's allergies indicates:  No Known Allergies      ROS:  Review of Systems   Constitutional: Negative.    HENT: Negative.     Eyes: Negative.    Respiratory:  Positive for shortness of breath.    Cardiovascular:  Negative for chest pain and palpitations.   Gastrointestinal: Negative.    Genitourinary: Negative.    Musculoskeletal: Negative.    Skin: Negative.    Neurological: Negative.    Endo/Heme/Allergies: Negative.    Psychiatric/Behavioral: Negative.                                                                                                                                                                                  Negative except as stated in the history of present illness. See HPI for details.    PHYSICAL EXAM:  Visit Vitals  /85 (BP Location: Left arm, Patient Position: Sitting, BP Method: X-Large (Automatic))   Pulse 64   Temp 98.1 °F (36.7 °C) (Oral)   Resp 20   Ht 5' 5" (1.651 m)   Wt 125.2 kg (276 lb)   SpO2 100%   BMI 45.93 kg/m²         Physical Exam  HENT:      Head: Normocephalic.      Nose: Nose normal.   Eyes:      Pupils: Pupils are equal, round, and reactive to light.   Cardiovascular:      Rate and Rhythm: Normal rate and regular rhythm.   Pulmonary:      Effort: Pulmonary effort is normal.   Abdominal:      General: Abdomen is flat. Bowel sounds are normal.      " Palpations: Abdomen is soft.   Musculoskeletal:         General: Normal range of motion.      Cervical back: Normal range of motion.   Skin:     General: Skin is warm.   Neurological:      General: No focal deficit present.      Mental Status: She is alert.   Psychiatric:         Mood and Affect: Mood normal.         Current Outpatient Medications   Medication Instructions    albuterol (PROVENTIL/VENTOLIN HFA) 90 mcg/actuation inhaler 2 puffs, Inhalation, Every 6 hours PRN    amLODIPine (NORVASC) 10 mg, Oral, Daily    atorvastatin (LIPITOR) 20 mg, Oral, Nightly    chlorthalidone (HYGROTEN) 50 mg, Oral, Daily    ergocalciferol (ERGOCALCIFEROL) 50,000 unit Cap Take by mouth.    FEROSUL 325 mg (65 mg iron) Tab tablet Oral    fluticasone-salmeterol 230-21 mcg/dose (ADVAIR HFA) 230-21 mcg/actuation HFAA inhaler 2 puffs, Inhalation, 2 times daily, Controller    hydrALAZINE (APRESOLINE) 100 mg, Oral, 3 times daily    losartan (COZAAR) 100 mg, Oral, Daily    metoprolol succinate (TOPROL-XL) 50 mg, Oral, Daily    ondansetron (ZOFRAN-ODT) 4 mg, Oral, Every 6 hours PRN    potassium chloride SA (K-DUR,KLOR-CON) 20 MEQ tablet 20 mEq, Oral, Daily    sertraline (ZOLOFT) 50 MG tablet Take 1/2 tab po Q hs X 1 week then increase to 1 tab po Q hs.        All medications, laboratory studies, cardiac diagnostic imaging reviewed.     Lab Results   Component Value Date    LDL 91.00 02/19/2024    .00 12/08/2020    TRIG 42 02/19/2024    TRIG 89 12/08/2020    CREATININE 0.86 05/20/2024    MG 2.2 03/17/2019    K 3.1 (L) 05/20/2024        ASSESSMENT/PLAN:  Palpitations- Resolved  - Continue Metoprolol Succinate 50 mg daily   - Obtain 48 Hour Holter Monitor to assess for any arrhythmias.  Patient does report a previous history of PVCs  - Advised to avoid/decrease caffeine intake, stimulants and remain well hydrated.    ROBIN  - Reports shortness of breath with over exertion or heavy housework  - EF-50-55%, mild MR per ECHO Sept. 2021  -  Symptoms likely multifactorial, considering asthma history.  However, would like to obtain Echocardiogram to assess LV function and check for any significant valvular abnormalities    Chest Heaviness- resolved   - Treadmill Stress Test - intermediate risk based on Elliott score.  The stress test nondiagnostic due to not reaching target heart rate.  Recommend further evaluation if clinically indicated  - Patient endorses complete resolution of symptoms with resumption of metoprolol.  Will hold off on further evaluation at this time.  Instructed patient to notify clinic of any anginal/anginal equivalent symptoms, consider Dobutamine Stress Echocardiogram at that time.    HTN  - BP at goal   - Cotinue current medications amlodipine 10 mg, hydralazine 100 mg TID, losartan 100 mg, chlorthalidone 50 mg, metoprolol succinate 50 mg  - Advised on low salt diet and exercise as tolerated   - Management per PCP    HLD  - LDL at goal   - Continue Atorvastatin 20 mg   - Advised on low-cholesterol diet and increased exercise as tolerated        48 hour Holter monitor   Echocardiogram  Follow up in Cardiology Clinic in 6 months or sooner pending results   Follow up with PCP as directed

## 2024-05-30 ENCOUNTER — OFFICE VISIT (OUTPATIENT)
Dept: GYNECOLOGY | Facility: CLINIC | Age: 34
End: 2024-05-30
Payer: MEDICAID

## 2024-05-30 ENCOUNTER — OFFICE VISIT (OUTPATIENT)
Dept: CARDIOLOGY | Facility: CLINIC | Age: 34
End: 2024-05-30
Payer: MEDICAID

## 2024-05-30 VITALS
SYSTOLIC BLOOD PRESSURE: 134 MMHG | HEIGHT: 65 IN | HEART RATE: 64 BPM | RESPIRATION RATE: 20 BRPM | WEIGHT: 276 LBS | OXYGEN SATURATION: 100 % | TEMPERATURE: 98 F | DIASTOLIC BLOOD PRESSURE: 85 MMHG | BODY MASS INDEX: 45.98 KG/M2

## 2024-05-30 VITALS
DIASTOLIC BLOOD PRESSURE: 76 MMHG | WEIGHT: 279.81 LBS | TEMPERATURE: 98 F | SYSTOLIC BLOOD PRESSURE: 138 MMHG | HEART RATE: 68 BPM | OXYGEN SATURATION: 100 % | HEIGHT: 65 IN | BODY MASS INDEX: 46.62 KG/M2 | RESPIRATION RATE: 18 BRPM

## 2024-05-30 DIAGNOSIS — E78.2 MIXED HYPERLIPIDEMIA: ICD-10-CM

## 2024-05-30 DIAGNOSIS — Z12.4 ENCOUNTER FOR PAPANICOLAOU SMEAR FOR CERVICAL CANCER SCREENING: Primary | ICD-10-CM

## 2024-05-30 DIAGNOSIS — Z11.3 SCREENING FOR STD (SEXUALLY TRANSMITTED DISEASE): ICD-10-CM

## 2024-05-30 DIAGNOSIS — I07.1 MILD TRICUSPID REGURGITATION: ICD-10-CM

## 2024-05-30 DIAGNOSIS — R00.2 PALPITATIONS: ICD-10-CM

## 2024-05-30 DIAGNOSIS — I10 PRIMARY HYPERTENSION: Primary | ICD-10-CM

## 2024-05-30 LAB
C TRACH DNA SPEC QL NAA+PROBE: NOT DETECTED
CLUE CELLS VAG QL WET PREP: ABNORMAL
N GONORRHOEA DNA SPEC QL NAA+PROBE: NOT DETECTED
SOURCE (OHS): NORMAL
T VAGINALIS VAG QL WET PREP: ABNORMAL
WBC #/AREA VAG WET PREP: ABNORMAL
YEAST SPEC QL WET PREP: ABNORMAL

## 2024-05-30 PROCEDURE — 1160F RVW MEDS BY RX/DR IN RCRD: CPT | Mod: CPTII,,, | Performed by: NURSE PRACTITIONER

## 2024-05-30 PROCEDURE — 3078F DIAST BP <80 MM HG: CPT | Mod: CPTII,,,

## 2024-05-30 PROCEDURE — 88174 CYTOPATH C/V AUTO IN FLUID: CPT

## 2024-05-30 PROCEDURE — 3075F SYST BP GE 130 - 139MM HG: CPT | Mod: CPTII,,, | Performed by: NURSE PRACTITIONER

## 2024-05-30 PROCEDURE — 87624 HPV HI-RISK TYP POOLED RSLT: CPT

## 2024-05-30 PROCEDURE — 99215 OFFICE O/P EST HI 40 MIN: CPT | Mod: PBBFAC | Performed by: NURSE PRACTITIONER

## 2024-05-30 PROCEDURE — 87210 SMEAR WET MOUNT SALINE/INK: CPT

## 2024-05-30 PROCEDURE — 99385 PREV VISIT NEW AGE 18-39: CPT | Mod: S$PBB,,,

## 2024-05-30 PROCEDURE — 99215 OFFICE O/P EST HI 40 MIN: CPT | Mod: PBBFAC,27

## 2024-05-30 PROCEDURE — 1159F MED LIST DOCD IN RCRD: CPT | Mod: CPTII,,,

## 2024-05-30 PROCEDURE — 3075F SYST BP GE 130 - 139MM HG: CPT | Mod: CPTII,,,

## 2024-05-30 PROCEDURE — 3008F BODY MASS INDEX DOCD: CPT | Mod: CPTII,,,

## 2024-05-30 PROCEDURE — 4010F ACE/ARB THERAPY RXD/TAKEN: CPT | Mod: CPTII,,,

## 2024-05-30 PROCEDURE — 3044F HG A1C LEVEL LT 7.0%: CPT | Mod: CPTII,,,

## 2024-05-30 PROCEDURE — 87591 N.GONORRHOEAE DNA AMP PROB: CPT

## 2024-05-30 PROCEDURE — 3044F HG A1C LEVEL LT 7.0%: CPT | Mod: CPTII,,, | Performed by: NURSE PRACTITIONER

## 2024-05-30 PROCEDURE — 3079F DIAST BP 80-89 MM HG: CPT | Mod: CPTII,,, | Performed by: NURSE PRACTITIONER

## 2024-05-30 PROCEDURE — 99214 OFFICE O/P EST MOD 30 MIN: CPT | Mod: S$PBB,,, | Performed by: NURSE PRACTITIONER

## 2024-05-30 PROCEDURE — 3008F BODY MASS INDEX DOCD: CPT | Mod: CPTII,,, | Performed by: NURSE PRACTITIONER

## 2024-05-30 PROCEDURE — 1159F MED LIST DOCD IN RCRD: CPT | Mod: CPTII,,, | Performed by: NURSE PRACTITIONER

## 2024-05-30 PROCEDURE — 4010F ACE/ARB THERAPY RXD/TAKEN: CPT | Mod: CPTII,,, | Performed by: NURSE PRACTITIONER

## 2024-05-30 NOTE — PROGRESS NOTES
[facility] -  Gynecology / Women's Health Clinic     Subjective:      Patient ID: Elian Mann is a 33 y.o. female.    Chief Complaint:  Gynecologic Exam      History of Present Illness:  The patient  here for annual exam. Her LMP was 24. Period last 5-7 days and changes pads 4-5x/day on heaviest 1 day, changes often d/t hygiene purposes, cycles monthly regular and manageable. Reports hx of abnormal pap with Colpo and ECC (see below). Last pap -LGSIL and HPV HR +. Denies breast or urinary complaints. Denies pelvic pain, abnormal bleeding or discharge. Hx of chlamydia and desires testing. Pt sexually active with 5 yr partner. HPV vaccinated. Contraception consist of condoms. Denies tobacco use. Denies fly hx of breast, ovarian, uterine or colon cancer.     3/2020 Endocervix, Curettage:  - Fragments of benign endocervical epithelium.    GYN & OB History:  Patient's last menstrual period was 2024 (exact date).   Date of Last Pap: 2020    OB History    Para Term  AB Living   3 3 3         SAB IAB Ectopic Multiple Live Births                  # Outcome Date GA Lbr Raghav/2nd Weight Sex Type Anes PTL Lv   3 Term      CS-LTranv      2 Term      Vag-Spont      1 Term      Vag-Spont          Past Medical History:   Diagnosis Date    Asthma     Hypertension         Past Surgical History:   Procedure Laterality Date     SECTION          Social History     Tobacco Use    Smoking status: Never     Passive exposure: Never    Smokeless tobacco: Never   Substance and Sexual Activity    Alcohol use: Never    Drug use: Not Currently     Types: Marijuana     Comment: states quit 4-5 months    Sexual activity: Yes        Current Outpatient Medications   Medication Instructions    albuterol (PROVENTIL/VENTOLIN HFA) 90 mcg/actuation inhaler 2 puffs, Inhalation, Every 6 hours PRN    amLODIPine (NORVASC) 10 mg, Oral, Daily    atorvastatin (LIPITOR) 20 mg, Oral, Nightly  "   chlorthalidone (HYGROTEN) 50 mg, Oral, Daily    ergocalciferol (ERGOCALCIFEROL) 50,000 unit Cap Take by mouth.    FEROSUL 325 mg (65 mg iron) Tab tablet Oral    fluticasone-salmeterol 230-21 mcg/dose (ADVAIR HFA) 230-21 mcg/actuation HFAA inhaler 2 puffs, Inhalation, 2 times daily, Controller    hydrALAZINE (APRESOLINE) 100 mg, Oral, 3 times daily    losartan (COZAAR) 100 mg, Oral, Daily    metoprolol succinate (TOPROL-XL) 50 mg, Oral, Daily    ondansetron (ZOFRAN-ODT) 4 mg, Oral, Every 6 hours PRN    potassium chloride SA (K-DUR,KLOR-CON) 20 MEQ tablet 20 mEq, Oral, Daily    sertraline (ZOLOFT) 50 MG tablet Take 1/2 tab po Q hs X 1 week then increase to 1 tab po Q hs.       Review of patient's allergies indicates:  No Known Allergies      Review of Systems:  Review of Systems  Negative except for pertinent findings for positives per HPI.     Objective:     Physical Exam   Visit Vitals  /76 (BP Location: Right arm, Patient Position: Sitting)   Pulse 68   Temp 98.4 °F (36.9 °C) (Oral)   Resp 18   Ht 5' 5" (1.651 m)   Wt 126.9 kg (279 lb 12.8 oz)   LMP 05/09/2024 (Exact Date)   SpO2 100%   BMI 46.56 kg/m²       GENERAL: Well-developed female. No acute distress.    SKIN: Normal to inspection, warm and intact.  BREASTS: No rashes or erythema. No masses, lumps, discharge, tenderness.  VULVA: General appearance normal; external genitalia with no lesions or erythema.  VAGINA: Mucosa/vaginal vault pink, no abnormal discharge or lesions.  CERVIX: Pink, parous appearing os, no erythema or abnormal discharge.  BIMANUAL EXAM: reveals a 8 week-sized uterus. The uterus is non tender. Bilateral adnexa reveal no tenderness.  PSYCHIATRIC: Patient is oriented to person, place, and time. Mood and affect are normal.    Assessment:       ICD-10-CM ICD-9-CM   1. Encounter for Papanicolaou smear for cervical cancer screening  Z12.4 V76.2   2. Screening for STD (sexually transmitted disease)  Z11.3 V74.5       Plan:     1. " Encounter for Papanicolaou smear for cervical cancer screening  -     Ambulatory referral/consult to Gynecology  -     Liquid-Based Pap Smear, Screening Screening    2. Screening for STD (sexually transmitted disease)  -     Chlamydia/GC, PCR  -     Wet Prep, Genital  -     HIV 1/2 Ag/Ab (4th Gen); Future; Expected date: 05/30/2024  -     Hepatitis C Antibody; Future; Expected date: 05/30/2024  -     Hepatitis B Surface Antigen; Future; Expected date: 05/30/2024  -     SYPHILIS ANTIBODY (WITH REFLEX RPR); Future; Expected date: 05/30/2024    Pap today  STD testing    Call for any GYN concerns  Follow up in about 1 year (around 5/30/2025) for Annual.

## 2024-06-04 LAB
HIGH RISK HPV: NEGATIVE
PSYCHE PATHOLOGY RESULT: NORMAL

## 2024-08-06 ENCOUNTER — TELEPHONE (OUTPATIENT)
Dept: INTERNAL MEDICINE | Facility: CLINIC | Age: 34
End: 2024-08-06
Payer: MEDICAID

## 2024-08-26 PROBLEM — Z00.00 WELL ADULT EXAM: Status: RESOLVED | Noted: 2024-01-10 | Resolved: 2024-08-26

## 2024-08-26 PROBLEM — I21.4 NSTEMI (NON-ST ELEVATED MYOCARDIAL INFARCTION): Status: RESOLVED | Noted: 2024-01-10 | Resolved: 2024-08-26

## 2024-10-21 ENCOUNTER — OFFICE VISIT (OUTPATIENT)
Dept: INTERNAL MEDICINE | Facility: CLINIC | Age: 34
End: 2024-10-21
Payer: MEDICAID

## 2024-10-21 VITALS
TEMPERATURE: 98 F | SYSTOLIC BLOOD PRESSURE: 121 MMHG | BODY MASS INDEX: 48.82 KG/M2 | HEART RATE: 63 BPM | RESPIRATION RATE: 18 BRPM | DIASTOLIC BLOOD PRESSURE: 75 MMHG | HEIGHT: 65 IN | WEIGHT: 293 LBS

## 2024-10-21 DIAGNOSIS — F41.9 ANXIETY: ICD-10-CM

## 2024-10-21 DIAGNOSIS — I10 PRIMARY HYPERTENSION: Primary | ICD-10-CM

## 2024-10-21 DIAGNOSIS — R79.89 ABNORMAL CBC: ICD-10-CM

## 2024-10-21 DIAGNOSIS — J45.20 MILD INTERMITTENT ASTHMA WITHOUT COMPLICATION: ICD-10-CM

## 2024-10-21 DIAGNOSIS — E87.6 HYPOKALEMIA: ICD-10-CM

## 2024-10-21 DIAGNOSIS — Z00.00 WELL ADULT EXAM: ICD-10-CM

## 2024-10-21 DIAGNOSIS — I25.2 HISTORY OF NON-ST ELEVATION MYOCARDIAL INFARCTION (NSTEMI): ICD-10-CM

## 2024-10-21 DIAGNOSIS — J45.909 ASTHMA, UNSPECIFIED ASTHMA SEVERITY, UNSPECIFIED WHETHER COMPLICATED, UNSPECIFIED WHETHER PERSISTENT: ICD-10-CM

## 2024-10-21 DIAGNOSIS — E78.2 MIXED HYPERLIPIDEMIA: ICD-10-CM

## 2024-10-21 PROCEDURE — 3074F SYST BP LT 130 MM HG: CPT | Mod: CPTII,,, | Performed by: NURSE PRACTITIONER

## 2024-10-21 PROCEDURE — 1159F MED LIST DOCD IN RCRD: CPT | Mod: CPTII,,, | Performed by: NURSE PRACTITIONER

## 2024-10-21 PROCEDURE — 3078F DIAST BP <80 MM HG: CPT | Mod: CPTII,,, | Performed by: NURSE PRACTITIONER

## 2024-10-21 PROCEDURE — 4010F ACE/ARB THERAPY RXD/TAKEN: CPT | Mod: CPTII,,, | Performed by: NURSE PRACTITIONER

## 2024-10-21 PROCEDURE — 3044F HG A1C LEVEL LT 7.0%: CPT | Mod: CPTII,,, | Performed by: NURSE PRACTITIONER

## 2024-10-21 PROCEDURE — 99214 OFFICE O/P EST MOD 30 MIN: CPT | Mod: S$PBB,,, | Performed by: NURSE PRACTITIONER

## 2024-10-21 PROCEDURE — 99213 OFFICE O/P EST LOW 20 MIN: CPT | Mod: PBBFAC | Performed by: NURSE PRACTITIONER

## 2024-10-21 PROCEDURE — 3008F BODY MASS INDEX DOCD: CPT | Mod: CPTII,,, | Performed by: NURSE PRACTITIONER

## 2024-10-21 RX ORDER — HYDRALAZINE HYDROCHLORIDE 100 MG/1
100 TABLET, FILM COATED ORAL 3 TIMES DAILY
Qty: 180 TABLET | Refills: 1 | Status: SHIPPED | OUTPATIENT
Start: 2024-10-21

## 2024-10-21 RX ORDER — ALBUTEROL SULFATE 90 UG/1
2 INHALANT RESPIRATORY (INHALATION) EVERY 6 HOURS PRN
Qty: 18 G | Refills: 6 | Status: SHIPPED | OUTPATIENT
Start: 2024-10-21

## 2024-10-21 RX ORDER — SERTRALINE HYDROCHLORIDE 50 MG/1
TABLET, FILM COATED ORAL
Qty: 90 TABLET | Refills: 1 | Status: SHIPPED | OUTPATIENT
Start: 2024-10-21

## 2024-10-21 RX ORDER — LOSARTAN POTASSIUM 100 MG/1
100 TABLET ORAL DAILY
Qty: 90 TABLET | Refills: 1 | Status: SHIPPED | OUTPATIENT
Start: 2024-10-21

## 2024-10-21 RX ORDER — CHLORTHALIDONE 50 MG/1
50 TABLET ORAL DAILY
Qty: 90 TABLET | Refills: 1 | Status: SHIPPED | OUTPATIENT
Start: 2024-10-21

## 2024-10-21 RX ORDER — METOPROLOL SUCCINATE 50 MG/1
50 TABLET, EXTENDED RELEASE ORAL DAILY
Qty: 90 TABLET | Refills: 1 | Status: SHIPPED | OUTPATIENT
Start: 2024-10-21 | End: 2025-10-21

## 2024-10-21 RX ORDER — ATORVASTATIN CALCIUM 20 MG/1
20 TABLET, FILM COATED ORAL NIGHTLY
Qty: 90 TABLET | Refills: 1 | Status: SHIPPED | OUTPATIENT
Start: 2024-10-21

## 2024-10-21 RX ORDER — FLUTICASONE PROPIONATE AND SALMETEROL XINAFOATE 230; 21 UG/1; UG/1
2 AEROSOL, METERED RESPIRATORY (INHALATION) 2 TIMES DAILY
Qty: 12 G | Refills: 3 | Status: SHIPPED | OUTPATIENT
Start: 2024-10-21 | End: 2025-10-21

## 2024-10-21 RX ORDER — POTASSIUM CHLORIDE 20 MEQ/1
20 TABLET, EXTENDED RELEASE ORAL DAILY
Qty: 30 TABLET | Refills: 3 | Status: SHIPPED | OUTPATIENT
Start: 2024-10-21

## 2024-10-21 NOTE — PROGRESS NOTES
Patient ID: [id number]     Chief Complaint: No chief complaint on file.        HPI:     HPI      Elian Mann is a 34 y.o. female here today for a follow up.         Optometrist:  Dentist:  Breast Cancer Screening:  [unfilled]   Cervical Cancer Screening:     Colorectal Cancer Screening:  Diabetic Eye Exam:  Diabetic Foot Exam:  Lung Cancer Screening:    Prostate Cancer Screening:  AAA Screening:  Osteoporosis Screening:  Medicare Wellness:   Immunizations:   Immunization History   Administered Date(s) Administered    Influenza - Quadrivalent - PF *Preferred* (6 months and older) 01/10/2024        -------------------------------------    Asthma    Hypertension        Past Surgical History:   Procedure Laterality Date     SECTION         Review of patient's allergies indicates:  No Known Allergies    Current Outpatient Medications   Medication Instructions    albuterol (PROVENTIL/VENTOLIN HFA) 90 mcg/actuation inhaler 2 puffs, Inhalation, Every 6 hours PRN    atorvastatin (LIPITOR) 20 mg, Oral, Nightly    chlorthalidone (HYGROTEN) 50 mg, Oral, Daily    ergocalciferol (ERGOCALCIFEROL) 50,000 unit Cap Take by mouth.    FEROSUL 325 mg (65 mg iron) Tab tablet Take by mouth.    fluticasone-salmeterol 230-21 mcg/dose (ADVAIR HFA) 230-21 mcg/actuation HFAA inhaler 2 puffs, Inhalation, 2 times daily, Controller    hydrALAZINE (APRESOLINE) 100 mg, Oral, 3 times daily    losartan (COZAAR) 100 mg, Oral, Daily    metoprolol succinate (TOPROL-XL) 50 mg, Oral, Daily    ondansetron (ZOFRAN-ODT) 4 mg, Oral, Every 6 hours PRN    potassium chloride SA (K-DUR,KLOR-CON) 20 MEQ tablet 20 mEq, Oral, Daily    sertraline (ZOLOFT) 50 MG tablet Take 1/2 tab po Q hs X 1 week then increase to 1 tab po Q hs.       Social History     Socioeconomic History    Marital status: Single   Tobacco Use    Smoking status: Never     Passive exposure: Never    Smokeless tobacco: Never   Substance and Sexual Activity    Alcohol use: Never     Drug use: Not Currently     Types: Marijuana     Comment: states quit 4-5 months    Sexual activity: Yes     Social Drivers of Health     Financial Resource Strain: Low Risk  (5/21/2024)    Overall Financial Resource Strain (CARDIA)     Difficulty of Paying Living Expenses: Not hard at all   Food Insecurity: No Food Insecurity (5/21/2024)    Hunger Vital Sign     Worried About Running Out of Food in the Last Year: Never true     Ran Out of Food in the Last Year: Never true   Transportation Needs: No Transportation Needs (5/13/2024)    PRAPARE - Transportation     Lack of Transportation (Medical): No     Lack of Transportation (Non-Medical): No   Physical Activity: Inactive (5/21/2024)    Exercise Vital Sign     Days of Exercise per Week: 0 days     Minutes of Exercise per Session: 0 min   Stress: No Stress Concern Present (5/21/2024)    [place] [employer] [place] of [employer] - Occupational Stress Questionnaire     Feeling of Stress : Not at all   Housing Stability: Low Risk  (1/10/2024)    Housing Stability Vital Sign     Unable to Pay for Housing in the Last Year: No     Number of Places Lived in the Last Year: 1     Unstable Housing in the Last Year: No        No family history on file.     Patient Care Team:  Coco Mcneill FNP as PCP - General (Family Medicine)     Subjective:     Review of Systems     See HPI for details    Constitutional: Denies Change in appetite. Denies Chills. Denies Fever. Denies Night sweats.  Eye: Denies Blurred vision. Denies Discharge. Denies Eye pain.  ENT: Denies Decreased hearing. Denies Sore throat. Denies Swollen glands.  Respiratory: Denies Cough. Denies Shortness of breath. Denies Shortness of breath with exertion. Denies Wheezing.  Cardiovascular: DeniesChest pain at rest. Denies Chest pain with exertion. Denies Irregular heartbeat. Denies Palpitations. Denies Edema.  Gastrointestinal: Denies Abdominal pain. DeniesDiarrhea. Denies Nausea. Denies Vomiting. Denies  "Hematemesis or Hematochezia.  Genitourinary: Denies Dysuria. Denies Urinary frequency. Denies Urinary urgency. Denies Blood in urine.  Endocrine: Denies Cold intolerance. Denies Excessive thirst. Denies Heat intolerance. Denies Weight loss. Denies Weight gain.  Musculoskeletal: Denies Painful joints. Denies Weakness.  Integumentary: Denies Rash. Denies Itching. Denies Dry skin.  Neurologic: Denies Dizziness. Denies Fainting. Denies Headache.  Psychiatric: Denies Depression. Denies Anxiety. Denies Suicidal/Homicidal ideations.    All Other ROS: Negative except as stated in HPI.       Objective:     Visit Vitals  /75 (BP Location: Right arm, Patient Position: Sitting)   Pulse 63   Temp 98.1 °F (36.7 °C) (Oral)   Resp 18   Ht 5' 5" (1.651 m)   Wt 134.7 kg (297 lb)   BMI 49.42 kg/m²       Physical Exam    General: Alert and oriented, No acute distress.  Head: Normocephalic, Atraumatic.  Eye: Pupils are equal, round and reactive to light, Extraocular movements are intact, Sclera non-icteric.  Ears/Nose/Throat: Normal, Mucosa moist,Clear.  Neck/Thyroid: Supple, Non-tender, No carotid bruit, No lymphadenopathy, No JVD, Full range of motion.  Respiratory: Clear to auscultation bilaterally; No wheezes, rales or rhonchi,Non-labored respirations, Symmetrical chest wall expansion.  Cardiovascular: Regular rate and rhythm, S1/S2 normal, No murmurs, rubs or gallops.  Gastrointestinal: Soft, Non-tender, Non-distended, Normal bowel sounds, No palpable organomegaly.  Musculoskeletal: Normal range of motion.  Integumentary: Warm, Dry, Intact, No suspicious lesions or rashes.  Extremities: No clubbing, cyanosis or edema  Neurologic: No focal deficits, Cranial Nerves II-XII are grossly intact, Motor strength normal upper and lower extremities, Sensory exam intact.  Psychiatric: Normal interaction, Coherent speech, Euthymic mood, Appropriate affect       Labs Reviewed:     Chemistry:  Lab Results   Component Value Date     " 05/20/2024     09/28/2021    K 3.1 (L) 05/20/2024    K 4.0 09/28/2021    BUN 14.9 05/20/2024    BUN 13 09/28/2021    CREATININE 0.86 05/20/2024    CREATININE 1.31 (H) 09/28/2021    EGFRNORACEVR >60 05/20/2024    GLUCOSE 98 05/20/2024    CALCIUM 9.6 05/20/2024    CALCIUM 9.2 09/28/2021    ALKPHOS 71 02/19/2024    LABPROT 7.6 02/19/2024    ALBUMIN 3.5 02/19/2024    BILIDIR 0.2 07/22/2020    IBILI 0.3 07/22/2020    AST 20 02/19/2024    ALT 17 02/19/2024    MG 2.2 03/17/2019    PHOS 3.9 03/17/2019        Lab Results   Component Value Date    HGBA1C 5.5 05/20/2024        Hematology:  Lab Results   Component Value Date    WBC 6.81 05/20/2024    HGB 11.7 (L) 05/20/2024    HCT 35.7 (L) 05/20/2024     05/20/2024       Lipid Panel:  Lab Results   Component Value Date    CHOL 161 02/19/2024    HDL 62 (H) 02/19/2024    LDL 91.00 02/19/2024    TRIG 42 02/19/2024    TOTALCHOLEST 3 02/19/2024        Urine:  Lab Results   Component Value Date    APPEARANCEUA Clear 05/20/2024    SGUA 1.017 05/20/2024    PROTEINUA Negative 05/20/2024    KETONESUA Negative 05/20/2024    LEUKOCYTESUR Negative 05/20/2024    RBCUA 0-5 05/20/2024    WBCUA 0-5 05/20/2024    BACTERIA None Seen 05/20/2024    SQEPUA Few (A) 05/20/2024    HYALINECASTS None Seen 05/20/2024    CREATRANDUR 38.0 03/18/2019    PROTEINURINE <5.0 03/18/2019        Assessment:       ICD-10-CM ICD-9-CM   1. Primary hypertension  I10 401.9   2. Mixed hyperlipidemia  E78.2 272.2   3. Mild intermittent asthma without complication  J45.20 493.90   4. History of non-ST elevation myocardial infarction (NSTEMI)  I25.2 412   5. Well adult exam  Z00.00 V70.0   6. Abnormal CBC  R79.89 790.6   7. Asthma, unspecified asthma severity, unspecified whether complicated, unspecified whether persistent  J45.909 493.90   8. Hypokalemia  E87.6 276.8   9. Anxiety  F41.9 300.00        Plan:     1. Primary hypertension (Primary)  BP controlled. Low fat low salt diet and exercise. Pt was  experiencing leg swelling so she stopped Amlodipine X 1 week ago and states leg swelling resolved. Pt followed in Cardio cl- keep appt tomorrow.     2. Mixed hyperlipidemia  Low fat diet and exercise. Cont Atorvastatin as prescribed.     3. Mild intermittent asthma without complication  Avoid triggers. Cont Albuterol and Advair HFA as prescribed. PT NS PFT 2-7-24. CXR 1-10-24 WNL. PFT in 3 months.     4. History of non-ST elevation myocardial infarction (NSTEMI)  Keep Cardio cl appts. ER precautions given.     5. Well adult exam  Labs in 3 months. Keep GYN cl appt.     6. Abnormal CBC  CBC stable. CBC in 3 months.          Follow up in about 3 months (around 1/21/2025) for with labs 1 week prior to appt. . In addition to their scheduled follow up, the patient has also been instructed to follow up on as needed basis.     Future Appointments   Date Time Provider Department Center   10/22/2024  9:00 AM [facility] ECHO 01 [facility] ECHO [facility]    12/3/2024 11:15 AM Rukhsana Malone FNP [facility] CARD [facility]   6/4/2025  1:10 PM Flora Medina FNP [facility] GYN [facility]        RACHEL [place]

## 2024-12-02 NOTE — PROGRESS NOTES
CHIEF COMPLAINT:   Chief Complaint   Patient presents with    Follow-up     Denies chest pains or SOB                                                    HPI:  Elian Mann 34 y.o. female with PMH HTN, HLD, asthma, obesity who presents to cardiology clinic for routine follow up and ongoing care.  The patient previously endorsed shortness of breath with over exertion and palpitations.  Subsequent testing was ordered, including an Echocardiogram and 48 hour Holter monitor.  However, it appears the patient has not completed the testing.  Previous Echocardiogram obtained in September revealed a preserved EF of 50-55% and mild mitral regurgitation.  Most recent ischemic evaluation includes a treadmill stress test on 3.8.24 that was nondiagnostic due to the patient not reaching target heart rate.     Patient presents to clinic reporting that she feels well overall.  She endorses exertional dyspnea and occasional midsternal chest tightness that occurs whenever she moves too fast or over exerts herself.  However, she states she is able to perform her basic ADLs and routine housework without experiencing any anginal or anginal equivalent symptoms.  To note, the patient does endorse improvement in her aforementioned symptoms with inhaler use.    She denies other cardiovascular complaints of palpitations, orthopnea, PND, peripheral edema, claudication, lightheadedness, dizziness, near-syncope or syncope.  She does not participate in any formal exercise regimen but states she has a plan to start ambulating routinely with her sister in efforts to increase her overall physical activity.  She is compliant with her current medications but states that she did not take her medications this morning because she had not eaten.    Family History:Mom-CMO/ICD, HLD, HTN, DM II; Father-  of cancer  Social History: Denies any illicit drug, ETOH or tobacco use                                                                                                                                                                                                                                                                                                 CARDIAC TESTING:    ECHO 9.29.21  RVSP 14 mmHg   · Left Ventricle: Normal left ventricle cavity size. Low normal (50-55%)   ejection fraction. Mild concentric hypertrophy observed.   · Left Atrium: Normal cavity size.   · Mitral Valve: Normal mitral valve structure. No mitral stenosis. Mild   mitral regurgitation. Tricuspid Valve: Normal tricuspid valve structure.   Trace tricuspid regurgitation. No pulmonary hypertension present. Aortic   Valve: The valve is tricuspid. No aortic stenosis. Trace aortic   regurgitation.   · Pericardium: No pericardial effusion.   Left Ventricle   Normal left ventricle cavity size. Low normal (50-55%) ejection fraction. Mild concentric hypertrophy observed.          Patient Active Problem List   Diagnosis    Asthma    HTN (hypertension)    Anxiety    Well adult exam    HLD (hyperlipidemia)    Obesity    Need for vaccination    Abnormal CBC    Hypokalemia     Past Surgical History:   Procedure Laterality Date     SECTION       Social History     Socioeconomic History    Marital status: Single   Tobacco Use    Smoking status: Never     Passive exposure: Never    Smokeless tobacco: Never   Substance and Sexual Activity    Alcohol use: Never    Drug use: Not Currently     Types: Marijuana     Comment: states quit 4-5 months    Sexual activity: Yes     Social Drivers of Health     Financial Resource Strain: Low Risk  (2024)    Overall Financial Resource Strain (CARDIA)     Difficulty of Paying Living Expenses: Not hard at all   Food Insecurity: No Food Insecurity (2024)    Hunger Vital Sign     Worried About Running Out of Food in the Last Year: Never true     Ran Out of Food in the Last Year: Never true   Transportation Needs: No Transportation Needs (2024)     "PRAPARE - Transportation     Lack of Transportation (Medical): No     Lack of Transportation (Non-Medical): No   Physical Activity: Inactive (5/21/2024)    Exercise Vital Sign     Days of Exercise per Week: 0 days     Minutes of Exercise per Session: 0 min   Stress: No Stress Concern Present (5/21/2024)    [employer] - Occupational Stress Questionnaire     Feeling of Stress : Not at all   Housing Stability: Low Risk  (1/10/2024)    Housing Stability Vital Sign     Unable to Pay for Housing in the Last Year: No     Number of Places Lived in the Last Year: 1     Unstable Housing in the Last Year: No        No family history on file.  Review of patient's allergies indicates:  No Known Allergies      ROS:  Review of Systems   Constitutional: Negative.    HENT: Negative.     Eyes: Negative.    Respiratory:  Positive for shortness of breath.    Cardiovascular:  Positive for chest pain. Negative for palpitations.   Gastrointestinal: Negative.    Genitourinary: Negative.    Musculoskeletal: Negative.    Skin: Negative.    Neurological: Negative.    Endo/Heme/Allergies: Negative.    Psychiatric/Behavioral: Negative.                                                                                                                                                                                  Negative except as stated in the history of present illness. See HPI for details.    PHYSICAL EXAM:  Visit Vitals  BP (!) 148/88 (BP Location: Left arm, Patient Position: Sitting)   Pulse (!) 58   Temp 98.6 °F (37 °C) (Oral)   Resp 20   Ht 5' 5" (1.651 m)   Wt (!) 139.2 kg (306 lb 12.8 oz)   SpO2 100%   BMI 51.05 kg/m²           Physical Exam  HENT:      Head: Normocephalic.      Nose: Nose normal.   Eyes:      Pupils: Pupils are equal, round, and reactive to light.   Cardiovascular:      Rate and Rhythm: Normal rate and regular rhythm.   Pulmonary:      Effort: Pulmonary effort is normal.   Abdominal:      " "General: Abdomen is flat. Bowel sounds are normal.      Palpations: Abdomen is soft.   Musculoskeletal:         General: Normal range of motion.      Cervical back: Normal range of motion.   Skin:     General: Skin is warm.   Neurological:      General: No focal deficit present.      Mental Status: She is alert.   Psychiatric:         Mood and Affect: Mood normal.         Current Outpatient Medications   Medication Instructions    albuterol (PROVENTIL/VENTOLIN HFA) 90 mcg/actuation inhaler 2 puffs, Inhalation, Every 6 hours PRN    atorvastatin (LIPITOR) 20 mg, Oral, Nightly    chlorthalidone (HYGROTEN) 50 mg, Oral, Daily    ergocalciferol (ERGOCALCIFEROL) 50,000 unit Cap Take by mouth.    FEROSUL 325 mg (65 mg iron) Tab tablet Take by mouth.    fluticasone-salmeterol 230-21 mcg/dose (ADVAIR HFA) 230-21 mcg/actuation HFAA inhaler 2 puffs, Inhalation, 2 times daily, Controller    hydrALAZINE (APRESOLINE) 100 mg, Oral, 3 times daily    losartan (COZAAR) 100 mg, Oral, Daily    metoprolol succinate (TOPROL-XL) 50 mg, Oral, Daily    ondansetron (ZOFRAN-ODT) 4 mg, Oral, Every 6 hours PRN    potassium chloride SA (K-DUR,KLOR-CON) 20 MEQ tablet 20 mEq, Oral, Daily    sertraline (ZOLOFT) 50 MG tablet Take 1/2 tab po Q hs X 1 week then increase to 1 tab po Q hs.        All medications, laboratory studies, cardiac diagnostic imaging reviewed.     Lab Results   Component Value Date    LDL 91.00 02/19/2024    .00 12/08/2020    TRIG 42 02/19/2024    TRIG 89 12/08/2020    CREATININE 0.86 05/20/2024    MG 2.2 03/17/2019    K 3.1 (L) 05/20/2024        ASSESSMENT/PLAN:  ROBIN  Chest Tightness  - Reports occasional shortness of breath and chest tightness whenever she reportedly "moves too fast" or over exerts herself.  Able to perform ADLs and routine  housework without angina or angina equivalent symptoms  - EF-50-55%, mild MR per ECHO Sept. 2021  - Treadmill Stress Test - intermediate risk based on Elliott score.  The stress test " is non-diagnostic due to not reaching target heart rate.   - Will obtain updated Echocardiogram to reassess LV function and check for any significant valvular abnormalities given exertional dyspnea.  Will also obtain further ischemic evaluation with a Dobutamine Stress Echocardiogram considering the patient is experiencing occasional midsternal chest tightness and shortness of breath with over exertion. Has risk factors of HTN, HLD, obesity  - ED precautions     HTN  - BP above goal, but appears normally well controlled per chart review.  Patient was not taking her medications today  - Cotinue current medications hydralazine 100 mg TID, losartan 100 mg, chlorthalidone 50 mg, metoprolol succinate 50 mg  - Reports amlodipine previously discontinued secondary to peripheral edema  - Instructed patient to monitor and log blood pressure and call with readings in 2 weeks to ensure adequate BP control  - Advised on low salt diet and exercise as tolerated   - Management per PCP    HLD  - LDL at goal   - Continue Atorvastatin 20 mg   - Advised on low-cholesterol diet and increased exercise as tolerated  - Management per PCP     Obesity  - Lengthy discussion with the patient regarding weight loss measures via diet and exercise.      DSE  Echocardiogram  NV in 2 weeks for BP check (call)   Follow up in Cardiology Clinic in 3 months or sooner pending results   Follow up with PCP as directed

## 2024-12-03 ENCOUNTER — OFFICE VISIT (OUTPATIENT)
Dept: CARDIOLOGY | Facility: CLINIC | Age: 34
End: 2024-12-03
Payer: MEDICAID

## 2024-12-03 VITALS
SYSTOLIC BLOOD PRESSURE: 148 MMHG | OXYGEN SATURATION: 100 % | HEART RATE: 58 BPM | WEIGHT: 293 LBS | DIASTOLIC BLOOD PRESSURE: 88 MMHG | BODY MASS INDEX: 48.82 KG/M2 | RESPIRATION RATE: 20 BRPM | HEIGHT: 65 IN | TEMPERATURE: 99 F

## 2024-12-03 DIAGNOSIS — I10 PRIMARY HYPERTENSION: Primary | ICD-10-CM

## 2024-12-03 DIAGNOSIS — R06.09 DOE (DYSPNEA ON EXERTION): ICD-10-CM

## 2024-12-03 DIAGNOSIS — R07.89 CHEST TIGHTNESS: ICD-10-CM

## 2024-12-03 DIAGNOSIS — E78.2 MIXED HYPERLIPIDEMIA: ICD-10-CM

## 2024-12-03 PROBLEM — R00.2 PALPITATIONS: Status: RESOLVED | Noted: 2024-01-10 | Resolved: 2024-12-03

## 2024-12-03 PROBLEM — I25.2 HISTORY OF NON-ST ELEVATION MYOCARDIAL INFARCTION (NSTEMI): Status: RESOLVED | Noted: 2024-10-21 | Resolved: 2024-12-03

## 2024-12-03 PROCEDURE — 3079F DIAST BP 80-89 MM HG: CPT | Mod: CPTII,,, | Performed by: NURSE PRACTITIONER

## 2024-12-03 PROCEDURE — 3008F BODY MASS INDEX DOCD: CPT | Mod: CPTII,,, | Performed by: NURSE PRACTITIONER

## 2024-12-03 PROCEDURE — 99214 OFFICE O/P EST MOD 30 MIN: CPT | Mod: PBBFAC | Performed by: NURSE PRACTITIONER

## 2024-12-03 PROCEDURE — 3077F SYST BP >= 140 MM HG: CPT | Mod: CPTII,,, | Performed by: NURSE PRACTITIONER

## 2024-12-03 PROCEDURE — 1159F MED LIST DOCD IN RCRD: CPT | Mod: CPTII,,, | Performed by: NURSE PRACTITIONER

## 2024-12-03 PROCEDURE — 1160F RVW MEDS BY RX/DR IN RCRD: CPT | Mod: CPTII,,, | Performed by: NURSE PRACTITIONER

## 2024-12-03 PROCEDURE — 3044F HG A1C LEVEL LT 7.0%: CPT | Mod: CPTII,,, | Performed by: NURSE PRACTITIONER

## 2024-12-03 PROCEDURE — 4010F ACE/ARB THERAPY RXD/TAKEN: CPT | Mod: CPTII,,, | Performed by: NURSE PRACTITIONER

## 2024-12-03 PROCEDURE — 99214 OFFICE O/P EST MOD 30 MIN: CPT | Mod: S$PBB,,, | Performed by: NURSE PRACTITIONER

## 2024-12-03 NOTE — PATIENT INSTRUCTIONS
Schedule/complete stress echo and echo prior to next visit; contact numbers provided    Call cardiology in 2 weeks for blood pressure log    Follow up in Cardiology Clinic in 3 months or sooner pending results; date below     Follow up with PCP as directed

## 2025-01-02 ENCOUNTER — PATIENT MESSAGE (OUTPATIENT)
Dept: INTERNAL MEDICINE | Facility: CLINIC | Age: 35
End: 2025-01-02
Payer: MEDICAID

## 2025-01-24 ENCOUNTER — OFFICE VISIT (OUTPATIENT)
Dept: INTERNAL MEDICINE | Facility: CLINIC | Age: 35
End: 2025-01-24
Payer: MEDICAID

## 2025-01-24 DIAGNOSIS — E87.6 HYPOKALEMIA: ICD-10-CM

## 2025-01-24 DIAGNOSIS — J45.909 ASTHMA, UNSPECIFIED ASTHMA SEVERITY, UNSPECIFIED WHETHER COMPLICATED, UNSPECIFIED WHETHER PERSISTENT: ICD-10-CM

## 2025-01-24 DIAGNOSIS — L30.9 ECZEMA, UNSPECIFIED TYPE: ICD-10-CM

## 2025-01-24 DIAGNOSIS — E78.2 MIXED HYPERLIPIDEMIA: ICD-10-CM

## 2025-01-24 DIAGNOSIS — E66.9 OBESITY, UNSPECIFIED CLASS, UNSPECIFIED OBESITY TYPE, UNSPECIFIED WHETHER SERIOUS COMORBIDITY PRESENT: ICD-10-CM

## 2025-01-24 DIAGNOSIS — J45.20 MILD INTERMITTENT ASTHMA WITHOUT COMPLICATION: ICD-10-CM

## 2025-01-24 DIAGNOSIS — Z00.00 WELL ADULT EXAM: ICD-10-CM

## 2025-01-24 DIAGNOSIS — F41.9 ANXIETY: ICD-10-CM

## 2025-01-24 DIAGNOSIS — I10 PRIMARY HYPERTENSION: Primary | ICD-10-CM

## 2025-01-24 RX ORDER — LOSARTAN POTASSIUM 100 MG/1
100 TABLET ORAL DAILY
Qty: 30 TABLET | Refills: 0 | Status: SHIPPED | OUTPATIENT
Start: 2025-01-24

## 2025-01-24 RX ORDER — ALBUTEROL SULFATE 90 UG/1
2 INHALANT RESPIRATORY (INHALATION) EVERY 6 HOURS PRN
Qty: 18 G | Refills: 3 | Status: SHIPPED | OUTPATIENT
Start: 2025-01-24

## 2025-01-24 RX ORDER — HYDRALAZINE HYDROCHLORIDE 100 MG/1
100 TABLET, FILM COATED ORAL 3 TIMES DAILY
Qty: 90 TABLET | Refills: 0 | Status: SHIPPED | OUTPATIENT
Start: 2025-01-24

## 2025-01-24 RX ORDER — METOPROLOL SUCCINATE 50 MG/1
50 TABLET, EXTENDED RELEASE ORAL DAILY
Qty: 30 TABLET | Refills: 0 | Status: SHIPPED | OUTPATIENT
Start: 2025-01-24 | End: 2026-01-24

## 2025-01-24 RX ORDER — DESONIDE 0.5 MG/G
CREAM TOPICAL 2 TIMES DAILY PRN
Qty: 60 G | Refills: 2 | Status: SHIPPED | OUTPATIENT
Start: 2025-01-24

## 2025-01-24 RX ORDER — FLUTICASONE PROPIONATE AND SALMETEROL XINAFOATE 230; 21 UG/1; UG/1
2 AEROSOL, METERED RESPIRATORY (INHALATION) 2 TIMES DAILY
Qty: 12 G | Refills: 3 | Status: SHIPPED | OUTPATIENT
Start: 2025-01-24 | End: 2026-01-24

## 2025-01-24 RX ORDER — CHLORTHALIDONE 50 MG/1
50 TABLET ORAL DAILY
Qty: 30 TABLET | Refills: 0 | Status: SHIPPED | OUTPATIENT
Start: 2025-01-24

## 2025-01-24 RX ORDER — POTASSIUM CHLORIDE 20 MEQ/1
20 TABLET, EXTENDED RELEASE ORAL DAILY
Qty: 30 TABLET | Refills: 0 | Status: SHIPPED | OUTPATIENT
Start: 2025-01-24

## 2025-01-24 RX ORDER — ATORVASTATIN CALCIUM 20 MG/1
20 TABLET, FILM COATED ORAL NIGHTLY
Qty: 30 TABLET | Refills: 0 | Status: SHIPPED | OUTPATIENT
Start: 2025-01-24

## 2025-01-24 RX ORDER — SERTRALINE HYDROCHLORIDE 50 MG/1
TABLET, FILM COATED ORAL
Qty: 30 TABLET | Refills: 0 | Status: SHIPPED | OUTPATIENT
Start: 2025-01-24

## 2025-01-24 NOTE — PROGRESS NOTES
Patient ID: [id number]     Chief Complaint: Follow-up (Lab results)      HPI:     This is a telemedicine note. Patient was treated using telemedicine, real time audio and video, according to [facility] protocols. Coco COLLADO, conducted the visit from the [facility]. The patient participated in the visit at a non-[facility] location selected by the patient, identified below. I am licensed in the state where the patient stated they are located. The patient stated that they understood and accepted the privacy and security risks to their information at their location. This visit is not recorded.    Patient was located at the patient's home.       Elian Mann is a 34 y.o. female here today for a telemedicine visit. Pt did not complete labs prior to appt today. Pt interested in gastric sleeve surgery and request      -------------------------------------    Asthma    Hypertension        Past Surgical History:   Procedure Laterality Date     SECTION         Review of patient's allergies indicates:  No Known Allergies    Outpatient Medications Marked as Taking for the 25 encounter (Office Visit) with Coco Mcneill FNP   Medication Sig Dispense Refill    albuterol (PROVENTIL/VENTOLIN HFA) 90 mcg/actuation inhaler Inhale 2 puffs into the lungs every 6 (six) hours as needed for Wheezing or Shortness of Breath. 18 g 6    atorvastatin (LIPITOR) 20 MG tablet Take 1 tablet (20 mg total) by mouth every evening. 90 tablet 1    chlorthalidone (HYGROTEN) 50 MG Tab Take 1 tablet (50 mg total) by mouth once daily. 90 tablet 1    hydrALAZINE (APRESOLINE) 100 MG tablet Take 1 tablet (100 mg total) by mouth 3 (three) times daily. 180 tablet 1    losartan (COZAAR) 100 MG tablet Take 1 tablet (100 mg total) by mouth once daily. 90 tablet 1    metoprolol succinate (TOPROL-XL) 50 MG 24 hr tablet Take 1 tablet (50 mg total) by mouth once daily. 90 tablet 1    potassium chloride SA  (K-DUR,KLOR-CON) 20 MEQ tablet Take 1 tablet (20 mEq total) by mouth once daily. 30 tablet 3    sertraline (ZOLOFT) 50 MG tablet Take 1/2 tab po Q hs X 1 week then increase to 1 tab po Q hs. 90 tablet 1       Social History     Socioeconomic History    Marital status: Single   Tobacco Use    Smoking status: Never     Passive exposure: Never    Smokeless tobacco: Never   Substance and Sexual Activity    Alcohol use: Never    Drug use: Not Currently     Types: Marijuana     Comment: states quit 4-5 months    Sexual activity: Yes     Social Drivers of Health     Financial Resource Strain: Low Risk  (1/24/2025)    Overall Financial Resource Strain (CARDIA)     Difficulty of Paying Living Expenses: Not hard at all   Food Insecurity: No Food Insecurity (1/24/2025)    Hunger Vital Sign     Worried About Running Out of Food in the Last Year: Never true     Ran Out of Food in the Last Year: Never true   Transportation Needs: No Transportation Needs (1/24/2025)    TRANSPORTATION NEEDS     Transportation : No   Physical Activity: Sufficiently Active (1/24/2025)    Exercise Vital Sign     Days of Exercise per Week: 5 days     Minutes of Exercise per Session: 30 min   Stress: No Stress Concern Present (1/24/2025)    [employer] - Occupational Stress Questionnaire     Feeling of Stress : Not at all   Housing Stability: Low Risk  (1/24/2025)    Housing Stability Vital Sign     Unable to Pay for Housing in the Last Year: No     Homeless in the Last Year: No        No family history on file.     Patient Care Team:  Coco Mcneill FNP as PCP - General (Family Medicine)      Subjective:       Review of Systems       See HPI for details    Constitutional: Denies Change in appetite. Denies Chills. Denies Fever. Denies Night sweats.  Eye: Denies Blurred vision. Denies Discharge. Denies Eye pain.  ENT: Denies Decreased hearing. Denies Sore throat. Denies Swollen glands.  Respiratory: Denies Cough. Denies  Shortness of breath. Denies Shortness of breath with exertion. Denies Wheezing.  Cardiovascular: DeniesChest pain at rest. Denies Chest pain with exertion. Denies Irregular heartbeat. Denies Palpitations. Denies Edema.  Gastrointestinal: Denies Abdominal pain. DeniesDiarrhea. Denies Nausea. Denies Vomiting. Denies Hematemesis or Hematochezia.  Genitourinary: Denies Dysuria. Denies Urinary frequency. Denies Urinary urgency. Denies Blood in urine.  Endocrine: Denies Cold intolerance. Denies Excessive thirst. Denies Heat intolerance. Denies Weight loss. Denies Weight gain.  Musculoskeletal: Denies Painful joints. Denies Weakness.  Integumentary: Denies Rash. Denies Itching. Denies Dry skin.  Neurologic: Denies Dizziness. Denies Fainting. Denies Headache.  Psychiatric: Denies Depression. Denies Anxiety. Denies Suicidal/Homicidal ideations.    All Other ROS: Negative except as stated in HPI.       Objective:     There were no vitals taken for this visit.    Physical Exam    Physical Exam: LIMITED DUE TO TELEMEDICINE RESTRICTIONS.  General: Alert and oriented, No acute distress.  Head: Normocephalic, Atraumatic.  Eye: Sclera non-icteric.  Neck/Thyroid:  Full range of motion.  Respiratory: Non-labored respirations, Symmetrical chest wall expansion.  Musculoskeletal: Normal range of motion.  Integumentary:  + scaly patches noted to mid forehead and nasal folds. No drainage noted.   Neurologic: No focal deficits  Psychiatric: Normal interaction, Coherent speech, Euthymic mood, Appropriate affect       Assessment:       ICD-10-CM ICD-9-CM   1. Primary hypertension  I10 401.9   2. Mixed hyperlipidemia  E78.2 272.2   3. Mild intermittent asthma without complication  J45.20 493.90   4. Well adult exam  Z00.00 V70.0   5. Hypokalemia  E87.6 276.8   6. Anxiety  F41.9 300.00   7. Obesity, unspecified class, unspecified obesity type, unspecified whether serious comorbidity present  E66.9 278.00   8. Asthma, unspecified asthma  "severity, unspecified whether complicated, unspecified whether persistent  J45.909 493.90   9. Eczema, unspecified type  L30.9 692.9        Plan:     1. Primary hypertension  Low fat low salt diet and exercise. Cont Losartan, Hydralazine, Metoprolol as prescribed. Informed pt she needs to have labs done prior to next appt. Pt verbalized she will come next week.     2. Mixed hyperlipidemia  Low fat diet and exercise. Cont Atorvastatin as prescribed.     3. Mild intermittent asthma without complication  Avoid triggers. Cont Ventolin HFA and Advair as prescribed. Pt NS PFT 1-6-25. Will reorder PFT.     4. Well adult exam  Labs in 1 month.     5. Hypokalemia  Labs in 1 month.    6. Anxiety  Cont Sertraline as prescribed. Denies SI/HI.     7. Obesity, unspecified class, unspecified obesity type, unspecified whether serious comorbidity present   Pt currently weighing 302 lbs and  5'5" and requesting referral for medical bariatric mgmt. Refer to Bariatrics for eval and mgmt.     8. Eczema  Pt has patches of dry skin to mid forehead and nasal folds. RX Desonide topical cream apply thin layer to affected area daily.        No orders of the defined types were placed in this encounter.         Follow up in about 1 month (around 2/24/2025) for F2F for lab results- please use telemed spot if needed. . In addition to their scheduled follow up, the patient has also been instructed to follow up on as needed basis.       Video Time Documentation:  Spent 18 minutes with patient face to face discussed health concerns. More than 50% of this time was spent in counseling and coordination of care.    "

## 2025-01-30 ENCOUNTER — PATIENT MESSAGE (OUTPATIENT)
Dept: INTERNAL MEDICINE | Facility: CLINIC | Age: 35
End: 2025-01-30
Payer: MEDICAID

## 2025-02-04 ENCOUNTER — HOSPITAL ENCOUNTER (OUTPATIENT)
Dept: CARDIOLOGY | Facility: HOSPITAL | Age: 35
Discharge: HOME | End: 2025-02-04
Attending: NURSE PRACTITIONER
Payer: MEDICAID

## 2025-02-04 VITALS
BODY MASS INDEX: 48.82 KG/M2 | SYSTOLIC BLOOD PRESSURE: 139 MMHG | WEIGHT: 293 LBS | DIASTOLIC BLOOD PRESSURE: 84 MMHG | HEIGHT: 65 IN

## 2025-02-04 DIAGNOSIS — R06.09 DOE (DYSPNEA ON EXERTION): ICD-10-CM

## 2025-02-04 PROCEDURE — 93306 TTE W/DOPPLER COMPLETE: CPT

## 2025-02-05 ENCOUNTER — TELEPHONE (OUTPATIENT)
Dept: CARDIOLOGY | Facility: CLINIC | Age: 35
End: 2025-02-05
Payer: MEDICAID

## 2025-02-05 LAB
APICAL FOUR CHAMBER EJECTION FRACTION: 57 %
APICAL TWO CHAMBER EJECTION FRACTION: 57 %
AV INDEX (PROSTH): 0.88
AV MEAN GRADIENT: 5 MMHG
AV PEAK GRADIENT: 7 MMHG
AV VALVE AREA BY VELOCITY RATIO: 2.4 CM²
AV VALVE AREA: 2.8 CM²
AV VELOCITY RATIO: 0.77
BSA FOR ECHO PROCEDURE: 2.52 M2
CV ECHO LV RWT: 0.39 CM
DOP CALC AO PEAK VEL: 1.3 M/S
DOP CALC AO VTI: 28.4 CM
DOP CALC LVOT AREA: 3.1 CM2
DOP CALC LVOT DIAMETER: 2 CM
DOP CALC LVOT PEAK VEL: 1 M/S
DOP CALC LVOT STROKE VOLUME: 78.8 CM3
DOP CALC MV VTI: 37.4 CM
DOP CALCLVOT PEAK VEL VTI: 25.1 CM
E WAVE DECELERATION TIME: 258 MSEC
E/A RATIO: 2.77
E/E' RATIO: 12 M/S
ECHO LV POSTERIOR WALL: 1.1 CM (ref 0.6–1.1)
FRACTIONAL SHORTENING: 33.9 % (ref 28–44)
HR MV ECHO: 57 BPM
INTERVENTRICULAR SEPTUM: 0.9 CM (ref 0.6–1.1)
LEFT ATRIUM SIZE: 4.4 CM
LEFT INTERNAL DIMENSION IN SYSTOLE: 3.7 CM (ref 2.1–4)
LEFT VENTRICLE DIASTOLIC VOLUME INDEX: 63.79 ML/M2
LEFT VENTRICLE DIASTOLIC VOLUME: 151.18 ML
LEFT VENTRICLE END DIASTOLIC VOLUME APICAL 2 CHAMBER: 153.79 ML
LEFT VENTRICLE END DIASTOLIC VOLUME APICAL 4 CHAMBER: 154.89 ML
LEFT VENTRICLE MASS INDEX: 92.7 G/M2
LEFT VENTRICLE SYSTOLIC VOLUME INDEX: 24.7 ML/M2
LEFT VENTRICLE SYSTOLIC VOLUME: 58.57 ML
LEFT VENTRICULAR INTERNAL DIMENSION IN DIASTOLE: 5.6 CM (ref 3.5–6)
LEFT VENTRICULAR MASS: 219.7 G
LV LATERAL E/E' RATIO: 11.8 M/S
LV SEPTAL E/E' RATIO: 11.8 M/S
LVED V (TEICH): 151.18 ML
LVES V (TEICH): 58.57 ML
LVOT MG: 2.32 MMHG
LVOT MV: 0.68 CM/S
MV MEAN GRADIENT: 2 MMHG
MV PEAK A VEL: 0.47 M/S
MV PEAK E VEL: 1.3 M/S
MV PEAK GRADIENT: 6 MMHG
MV STENOSIS PRESSURE HALF TIME: 74.8 MS
MV VALVE AREA BY CONTINUITY EQUATION: 2.11 CM2
MV VALVE AREA P 1/2 METHOD: 2.94 CM2
OHS CV RV/LV RATIO: 0.57 CM
OHS LV EJECTION FRACTION SIMPSONS BIPLANE MOD: 55 %
PISA TR MAX VEL: 1.9 M/S
RA PRESSURE ESTIMATED: 3 MMHG
RIGHT VENTRICLE DIASTOLIC BASEL DIMENSION: 3.2 CM
RIGHT VENTRICULAR END-DIASTOLIC DIMENSION: 3.22 CM
RV TB RVSP: 5 MMHG
TDI LATERAL: 0.11 M/S
TDI SEPTAL: 0.11 M/S
TDI: 0.11 M/S
TR MAX PG: 14 MMHG
TRICUSPID ANNULAR PLANE SYSTOLIC EXCURSION: 2.5 CM
TV REST PULMONARY ARTERY PRESSURE: 17 MMHG
Z-SCORE OF LEFT VENTRICULAR DIMENSION IN END DIASTOLE: -6.04
Z-SCORE OF LEFT VENTRICULAR DIMENSION IN END SYSTOLE: -4.02

## 2025-02-06 ENCOUNTER — PATIENT MESSAGE (OUTPATIENT)
Dept: INTERNAL MEDICINE | Facility: CLINIC | Age: 35
End: 2025-02-06
Payer: MEDICAID

## 2025-02-06 ENCOUNTER — TELEPHONE (OUTPATIENT)
Dept: INTERNAL MEDICINE | Facility: CLINIC | Age: 35
End: 2025-02-06
Payer: MEDICAID

## 2025-02-06 NOTE — TELEPHONE ENCOUNTER
Returned patient's call informed her to ask her insurance company what cream was on her plan so KUNAL Mcneill could send a rx to her pharmacy. Informed patient KUNAL Mcneill has sent two referrals to [facility]. Advised patient to call to see if they had a scheduled date for her. Patient verbalized understanding of all information given to her today.

## 2025-02-12 ENCOUNTER — TELEPHONE (OUTPATIENT)
Dept: INTERNAL MEDICINE | Facility: CLINIC | Age: 35
End: 2025-02-12
Payer: MEDICAID

## 2025-02-12 NOTE — TELEPHONE ENCOUNTER
Patient Bariatric Surgery referral faxed to [facility]. Patient informed.

## 2025-02-24 ENCOUNTER — PATIENT MESSAGE (OUTPATIENT)
Dept: INTERNAL MEDICINE | Facility: CLINIC | Age: 35
End: 2025-02-24
Payer: MEDICAID

## 2025-02-24 DIAGNOSIS — E66.9 OBESITY, UNSPECIFIED CLASS, UNSPECIFIED OBESITY TYPE, UNSPECIFIED WHETHER SERIOUS COMORBIDITY PRESENT: Primary | ICD-10-CM

## 2025-02-24 NOTE — PROGRESS NOTES
Pt called clinic stating her insurance will not cover medical wt loss and will only cover surgical wt loss. Refer to [facility] for surgical wt loss evaluation.

## 2025-03-20 ENCOUNTER — PATIENT MESSAGE (OUTPATIENT)
Dept: INTERNAL MEDICINE | Facility: CLINIC | Age: 35
End: 2025-03-20
Payer: MEDICAID

## 2025-03-20 ENCOUNTER — TELEPHONE (OUTPATIENT)
Dept: INTERNAL MEDICINE | Facility: CLINIC | Age: 35
End: 2025-03-20
Payer: MEDICAID

## 2025-03-20 NOTE — TELEPHONE ENCOUNTER
Metoprolol last filled on 2/15/25 30 day Sertraline  3/12/25, 9 day  LOV: 2/5/25  No show for last visit 3/19/25

## 2025-06-02 ENCOUNTER — PATIENT MESSAGE (OUTPATIENT)
Dept: INTERNAL MEDICINE | Facility: CLINIC | Age: 35
End: 2025-06-02
Payer: MEDICAID

## 2025-06-02 DIAGNOSIS — E87.6 HYPOKALEMIA: ICD-10-CM

## 2025-06-02 DIAGNOSIS — I10 PRIMARY HYPERTENSION: ICD-10-CM

## 2025-06-02 DIAGNOSIS — J45.909 ASTHMA, UNSPECIFIED ASTHMA SEVERITY, UNSPECIFIED WHETHER COMPLICATED, UNSPECIFIED WHETHER PERSISTENT: ICD-10-CM

## 2025-06-02 DIAGNOSIS — F41.9 ANXIETY: ICD-10-CM

## 2025-06-02 DIAGNOSIS — E78.2 MIXED HYPERLIPIDEMIA: ICD-10-CM

## 2025-06-03 DIAGNOSIS — I10 PRIMARY HYPERTENSION: Primary | ICD-10-CM

## 2025-06-03 RX ORDER — LOSARTAN POTASSIUM 100 MG/1
100 TABLET ORAL DAILY
Qty: 30 TABLET | Refills: 0 | OUTPATIENT
Start: 2025-06-03

## 2025-06-03 RX ORDER — CHLORTHALIDONE 50 MG/1
50 TABLET ORAL DAILY
Qty: 30 TABLET | Refills: 0 | OUTPATIENT
Start: 2025-06-03

## 2025-06-03 RX ORDER — ALBUTEROL SULFATE 90 UG/1
2 INHALANT RESPIRATORY (INHALATION) EVERY 6 HOURS PRN
Qty: 18 G | Refills: 3 | OUTPATIENT
Start: 2025-06-03

## 2025-06-03 RX ORDER — HYDRALAZINE HYDROCHLORIDE 100 MG/1
100 TABLET, FILM COATED ORAL 3 TIMES DAILY
Qty: 90 TABLET | Refills: 0 | OUTPATIENT
Start: 2025-06-03

## 2025-06-03 RX ORDER — POTASSIUM CHLORIDE 20 MEQ/1
20 TABLET, EXTENDED RELEASE ORAL DAILY
Qty: 30 TABLET | Refills: 0 | OUTPATIENT
Start: 2025-06-03

## 2025-06-03 RX ORDER — SERTRALINE HYDROCHLORIDE 50 MG/1
TABLET, FILM COATED ORAL
Qty: 30 TABLET | Refills: 0 | OUTPATIENT
Start: 2025-06-03

## 2025-06-03 RX ORDER — METOPROLOL SUCCINATE 50 MG/1
50 TABLET, EXTENDED RELEASE ORAL DAILY
Qty: 30 TABLET | Refills: 0 | OUTPATIENT
Start: 2025-06-03 | End: 2026-06-03

## 2025-06-03 RX ORDER — ATORVASTATIN CALCIUM 20 MG/1
20 TABLET, FILM COATED ORAL NIGHTLY
Qty: 30 TABLET | Refills: 0 | OUTPATIENT
Start: 2025-06-03

## 2025-06-12 DIAGNOSIS — I10 PRIMARY HYPERTENSION: Primary | ICD-10-CM

## 2025-06-12 NOTE — TELEPHONE ENCOUNTER
Informed patient we will call her today when clinic is over to give her xray results. Patient verbalized understanding.

## 2025-06-12 NOTE — TELEPHONE ENCOUNTER
Patient scheduled for 7/22/25. Patient informed me she will do her labs in am. I informed patient to let me know when she completed her blood work so I could ask KUNAL Mcneill to order her medications. Patient verbalized understanding.

## 2025-06-20 ENCOUNTER — PATIENT MESSAGE (OUTPATIENT)
Dept: INTERNAL MEDICINE | Facility: CLINIC | Age: 35
End: 2025-06-20
Payer: MEDICAID

## 2025-06-20 ENCOUNTER — LAB VISIT (OUTPATIENT)
Dept: LAB | Facility: HOSPITAL | Age: 35
End: 2025-06-20
Attending: NURSE PRACTITIONER
Payer: MEDICAID

## 2025-06-20 DIAGNOSIS — E87.6 HYPOKALEMIA: ICD-10-CM

## 2025-06-20 DIAGNOSIS — F41.9 ANXIETY: ICD-10-CM

## 2025-06-20 DIAGNOSIS — J45.909 ASTHMA, UNSPECIFIED ASTHMA SEVERITY, UNSPECIFIED WHETHER COMPLICATED, UNSPECIFIED WHETHER PERSISTENT: ICD-10-CM

## 2025-06-20 DIAGNOSIS — E78.2 MIXED HYPERLIPIDEMIA: ICD-10-CM

## 2025-06-20 DIAGNOSIS — I10 PRIMARY HYPERTENSION: ICD-10-CM

## 2025-06-20 LAB
ALBUMIN SERPL-MCNC: 3.2 G/DL (ref 3.5–5)
ALBUMIN/GLOB SERPL: 0.7 RATIO (ref 1.1–2)
ALP SERPL-CCNC: 79 UNIT/L (ref 40–150)
ALT SERPL-CCNC: 28 UNIT/L (ref 0–55)
ANION GAP SERPL CALC-SCNC: 6 MEQ/L
AST SERPL-CCNC: 19 UNIT/L (ref 11–45)
BACTERIA #/AREA URNS AUTO: ABNORMAL /HPF
BASOPHILS # BLD AUTO: 0.04 X10(3)/MCL
BASOPHILS NFR BLD AUTO: 0.5 %
BILIRUB SERPL-MCNC: 0.6 MG/DL
BILIRUB UR QL STRIP.AUTO: NEGATIVE
BUN SERPL-MCNC: 14.7 MG/DL (ref 7–18.7)
CALCIUM SERPL-MCNC: 8.8 MG/DL (ref 8.4–10.2)
CHLORIDE SERPL-SCNC: 104 MMOL/L (ref 98–107)
CHOLEST SERPL-MCNC: 146 MG/DL
CHOLEST/HDLC SERPL: 3 {RATIO} (ref 0–5)
CLARITY UR: CLEAR
CO2 SERPL-SCNC: 28 MMOL/L (ref 22–29)
COLOR UR AUTO: ABNORMAL
CREAT SERPL-MCNC: 0.91 MG/DL (ref 0.55–1.02)
CREAT/UREA NIT SERPL: 16
EOSINOPHIL # BLD AUTO: 0.22 X10(3)/MCL (ref 0–0.9)
EOSINOPHIL NFR BLD AUTO: 3 %
ERYTHROCYTE [DISTWIDTH] IN BLOOD BY AUTOMATED COUNT: 12.3 % (ref 11.5–17)
EST. AVERAGE GLUCOSE BLD GHB EST-MCNC: 188.6 MG/DL
GFR SERPLBLD CREATININE-BSD FMLA CKD-EPI: >60 ML/MIN/1.73/M2
GLOBULIN SER-MCNC: 4.5 GM/DL (ref 2.4–3.5)
GLUCOSE SERPL-MCNC: 270 MG/DL (ref 74–100)
GLUCOSE UR QL STRIP: ABNORMAL
HBA1C MFR BLD: 8.2 %
HCT VFR BLD AUTO: 35.5 % (ref 37–47)
HDLC SERPL-MCNC: 50 MG/DL (ref 35–60)
HGB BLD-MCNC: 11.7 G/DL (ref 12–16)
HGB UR QL STRIP: NEGATIVE
HYALINE CASTS #/AREA URNS LPF: ABNORMAL /LPF
IMM GRANULOCYTES # BLD AUTO: 0.03 X10(3)/MCL (ref 0–0.04)
IMM GRANULOCYTES NFR BLD AUTO: 0.4 %
KETONES UR QL STRIP: NEGATIVE
LDLC SERPL CALC-MCNC: 75 MG/DL (ref 50–140)
LEUKOCYTE ESTERASE UR QL STRIP: NEGATIVE
LYMPHOCYTES # BLD AUTO: 2.63 X10(3)/MCL (ref 0.6–4.6)
LYMPHOCYTES NFR BLD AUTO: 35.3 %
MCH RBC QN AUTO: 32.3 PG (ref 27–31)
MCHC RBC AUTO-ENTMCNC: 33 G/DL (ref 33–36)
MCV RBC AUTO: 98.1 FL (ref 80–94)
MONOCYTES # BLD AUTO: 0.48 X10(3)/MCL (ref 0.1–1.3)
MONOCYTES NFR BLD AUTO: 6.5 %
MUCOUS THREADS URNS QL MICRO: ABNORMAL /LPF
NEUTROPHILS # BLD AUTO: 4.04 X10(3)/MCL (ref 2.1–9.2)
NEUTROPHILS NFR BLD AUTO: 54.3 %
NITRITE UR QL STRIP: NEGATIVE
NRBC BLD AUTO-RTO: 0 %
PH UR STRIP: 6 [PH]
PLATELET # BLD AUTO: 245 X10(3)/MCL (ref 130–400)
PMV BLD AUTO: 11 FL (ref 7.4–10.4)
POTASSIUM SERPL-SCNC: 3.4 MMOL/L (ref 3.5–5.1)
PROT SERPL-MCNC: 7.7 GM/DL (ref 6.4–8.3)
PROT UR QL STRIP: NEGATIVE
RBC # BLD AUTO: 3.62 X10(6)/MCL (ref 4.2–5.4)
RBC #/AREA URNS AUTO: ABNORMAL /HPF
SODIUM SERPL-SCNC: 138 MMOL/L (ref 136–145)
SP GR UR STRIP.AUTO: 1.02 (ref 1–1.03)
SQUAMOUS #/AREA URNS LPF: ABNORMAL /HPF
TRIGL SERPL-MCNC: 106 MG/DL (ref 37–140)
TSH SERPL-ACNC: 1.27 UIU/ML (ref 0.35–4.94)
UROBILINOGEN UR STRIP-ACNC: NORMAL
VLDLC SERPL CALC-MCNC: 21 MG/DL
WBC # BLD AUTO: 7.44 X10(3)/MCL (ref 4.5–11.5)
WBC #/AREA URNS AUTO: ABNORMAL /HPF

## 2025-06-20 PROCEDURE — 81001 URINALYSIS AUTO W/SCOPE: CPT

## 2025-06-20 PROCEDURE — 36415 COLL VENOUS BLD VENIPUNCTURE: CPT

## 2025-06-20 PROCEDURE — 85025 COMPLETE CBC W/AUTO DIFF WBC: CPT

## 2025-06-20 PROCEDURE — 80053 COMPREHEN METABOLIC PANEL: CPT

## 2025-06-20 PROCEDURE — 80061 LIPID PANEL: CPT

## 2025-06-20 PROCEDURE — 84443 ASSAY THYROID STIM HORMONE: CPT

## 2025-06-20 PROCEDURE — 83036 HEMOGLOBIN GLYCOSYLATED A1C: CPT

## 2025-06-20 RX ORDER — CHLORTHALIDONE 50 MG/1
50 TABLET ORAL DAILY
Qty: 30 TABLET | Refills: 6 | Status: SHIPPED | OUTPATIENT
Start: 2025-06-20

## 2025-06-20 RX ORDER — SERTRALINE HYDROCHLORIDE 50 MG/1
TABLET, FILM COATED ORAL
Qty: 30 TABLET | Refills: 6 | Status: SHIPPED | OUTPATIENT
Start: 2025-06-20

## 2025-06-20 RX ORDER — HYDRALAZINE HYDROCHLORIDE 100 MG/1
100 TABLET, FILM COATED ORAL 3 TIMES DAILY
Qty: 90 TABLET | Refills: 1 | Status: SHIPPED | OUTPATIENT
Start: 2025-06-20

## 2025-06-20 RX ORDER — METOPROLOL SUCCINATE 50 MG/1
50 TABLET, EXTENDED RELEASE ORAL DAILY
Qty: 30 TABLET | Refills: 6 | Status: SHIPPED | OUTPATIENT
Start: 2025-06-20 | End: 2026-06-20

## 2025-06-20 RX ORDER — ATORVASTATIN CALCIUM 20 MG/1
20 TABLET, FILM COATED ORAL NIGHTLY
Qty: 30 TABLET | Refills: 6 | Status: SHIPPED | OUTPATIENT
Start: 2025-06-20

## 2025-06-20 RX ORDER — LOSARTAN POTASSIUM 100 MG/1
100 TABLET ORAL DAILY
Qty: 30 TABLET | Refills: 6 | Status: SHIPPED | OUTPATIENT
Start: 2025-06-20

## 2025-06-20 RX ORDER — FLUTICASONE PROPIONATE AND SALMETEROL XINAFOATE 230; 21 UG/1; UG/1
2 AEROSOL, METERED RESPIRATORY (INHALATION) 2 TIMES DAILY
Qty: 12 G | Refills: 6 | Status: SHIPPED | OUTPATIENT
Start: 2025-06-20 | End: 2026-06-20

## 2025-06-20 RX ORDER — ALBUTEROL SULFATE 90 UG/1
2 INHALANT RESPIRATORY (INHALATION) EVERY 6 HOURS PRN
Qty: 18 G | Refills: 6 | Status: SHIPPED | OUTPATIENT
Start: 2025-06-20

## 2025-06-20 RX ORDER — POTASSIUM CHLORIDE 20 MEQ/1
20 TABLET, EXTENDED RELEASE ORAL DAILY
Qty: 30 TABLET | Refills: 6 | Status: SHIPPED | OUTPATIENT
Start: 2025-06-20

## 2025-07-22 ENCOUNTER — OFFICE VISIT (OUTPATIENT)
Dept: INTERNAL MEDICINE | Facility: CLINIC | Age: 35
End: 2025-07-22
Payer: MEDICAID

## 2025-07-22 VITALS
SYSTOLIC BLOOD PRESSURE: 131 MMHG | TEMPERATURE: 98 F | BODY MASS INDEX: 48.82 KG/M2 | HEART RATE: 71 BPM | WEIGHT: 293 LBS | DIASTOLIC BLOOD PRESSURE: 79 MMHG | RESPIRATION RATE: 18 BRPM | HEIGHT: 65 IN

## 2025-07-22 DIAGNOSIS — Z00.00 WELL ADULT EXAM: ICD-10-CM

## 2025-07-22 DIAGNOSIS — E66.9 OBESITY, UNSPECIFIED CLASS, UNSPECIFIED OBESITY TYPE, UNSPECIFIED WHETHER SERIOUS COMORBIDITY PRESENT: ICD-10-CM

## 2025-07-22 DIAGNOSIS — R79.89 ABNORMAL CBC: ICD-10-CM

## 2025-07-22 DIAGNOSIS — J45.20 MILD INTERMITTENT ASTHMA WITHOUT COMPLICATION: ICD-10-CM

## 2025-07-22 DIAGNOSIS — E78.2 MIXED HYPERLIPIDEMIA: ICD-10-CM

## 2025-07-22 DIAGNOSIS — E11.9 NEW ONSET TYPE 2 DIABETES MELLITUS: Primary | ICD-10-CM

## 2025-07-22 DIAGNOSIS — F41.9 ANXIETY: ICD-10-CM

## 2025-07-22 DIAGNOSIS — K21.9 GASTROESOPHAGEAL REFLUX DISEASE, UNSPECIFIED WHETHER ESOPHAGITIS PRESENT: ICD-10-CM

## 2025-07-22 DIAGNOSIS — I10 PRIMARY HYPERTENSION: ICD-10-CM

## 2025-07-22 PROCEDURE — 1159F MED LIST DOCD IN RCRD: CPT | Mod: CPTII,,, | Performed by: NURSE PRACTITIONER

## 2025-07-22 PROCEDURE — 1160F RVW MEDS BY RX/DR IN RCRD: CPT | Mod: CPTII,,, | Performed by: NURSE PRACTITIONER

## 2025-07-22 PROCEDURE — 3008F BODY MASS INDEX DOCD: CPT | Mod: CPTII,,, | Performed by: NURSE PRACTITIONER

## 2025-07-22 PROCEDURE — 99214 OFFICE O/P EST MOD 30 MIN: CPT | Mod: S$PBB,,, | Performed by: NURSE PRACTITIONER

## 2025-07-22 PROCEDURE — 3078F DIAST BP <80 MM HG: CPT | Mod: CPTII,,, | Performed by: NURSE PRACTITIONER

## 2025-07-22 PROCEDURE — 4010F ACE/ARB THERAPY RXD/TAKEN: CPT | Mod: CPTII,,, | Performed by: NURSE PRACTITIONER

## 2025-07-22 PROCEDURE — 3052F HG A1C>EQUAL 8.0%<EQUAL 9.0%: CPT | Mod: CPTII,,, | Performed by: NURSE PRACTITIONER

## 2025-07-22 PROCEDURE — 99214 OFFICE O/P EST MOD 30 MIN: CPT | Mod: PBBFAC | Performed by: NURSE PRACTITIONER

## 2025-07-22 PROCEDURE — 3075F SYST BP GE 130 - 139MM HG: CPT | Mod: CPTII,,, | Performed by: NURSE PRACTITIONER

## 2025-07-22 RX ORDER — INSULIN PUMP SYRINGE, 3 ML
EACH MISCELLANEOUS
Qty: 1 EACH | Refills: 0 | Status: SHIPPED | OUTPATIENT
Start: 2025-07-22

## 2025-07-22 RX ORDER — METFORMIN HYDROCHLORIDE 500 MG/1
500 TABLET, EXTENDED RELEASE ORAL
Qty: 90 TABLET | Refills: 1 | Status: SHIPPED | OUTPATIENT
Start: 2025-07-22

## 2025-07-22 RX ORDER — LANCETS
EACH MISCELLANEOUS
Qty: 100 EACH | Refills: 6 | Status: SHIPPED | OUTPATIENT
Start: 2025-07-22

## 2025-07-22 RX ORDER — FAMOTIDINE 20 MG/1
20 TABLET, FILM COATED ORAL 2 TIMES DAILY
Qty: 180 TABLET | Refills: 1 | Status: SHIPPED | OUTPATIENT
Start: 2025-07-22 | End: 2026-07-22

## 2025-07-22 NOTE — PROGRESS NOTES
Patient ID: [id number]     Chief Complaint: Hypertension        HPI:     HPI      Elian Mann is a 34 y.o. female here today for a follow up.         Immunizations:   Immunization History   Administered Date(s) Administered    Influenza - Quadrivalent - PF *Preferred* (6 months and older) 01/10/2024        -------------------------------------    Asthma    Hypertension    New onset type 2 diabetes mellitus        Past Surgical History:   Procedure Laterality Date     SECTION         Review of patient's allergies indicates:  No Known Allergies    Current Outpatient Medications   Medication Instructions    albuterol (PROVENTIL/VENTOLIN HFA) 90 mcg/actuation inhaler 2 puffs, Inhalation, Every 6 hours PRN    atorvastatin (LIPITOR) 20 mg, Oral, Nightly    chlorthalidone (HYGROTEN) 50 mg, Oral, Daily    desonide (DESOWEN) 0.05 % cream Topical (Top), 2 times daily PRN, Apply a thin layer to affected area.    ergocalciferol (ERGOCALCIFEROL) 50,000 unit Cap Take by mouth.    FEROSUL 325 mg (65 mg iron) Tab tablet Take by mouth.    fluticasone-salmeterol 230-21 mcg/dose (ADVAIR HFA) 230-21 mcg/actuation HFAA inhaler 2 puffs, Inhalation, 2 times daily, Controller    hydrALAZINE (APRESOLINE) 100 mg, Oral, 3 times daily    losartan (COZAAR) 100 mg, Oral, Daily    metoprolol succinate (TOPROL-XL) 50 mg, Oral, Daily    ondansetron (ZOFRAN-ODT) 4 mg, Oral, Every 6 hours PRN    potassium chloride SA (K-DUR,KLOR-CON) 20 MEQ tablet 20 mEq, Oral, Daily    sertraline (ZOLOFT) 50 MG tablet Take 1/2 tab po Q hs X 1 week then increase to 1 tab po Q hs.       Social History[1]     No family history on file.     Patient Care Team:  Coco Mcneill FNP as PCP - General (Family Medicine)     Subjective:     Review of Systems     See HPI for details    Constitutional: Denies Change in appetite. Denies Chills. Denies Fever. Denies Night sweats.  Eye: Denies Blurred vision. Denies Discharge. Denies Eye pain.  ENT: Denies  "Decreased hearing. Denies Sore throat. Denies Swollen glands.  Respiratory: Denies Cough. Denies Shortness of breath. Denies Shortness of breath with exertion. Denies Wheezing.  Cardiovascular: DeniesChest pain at rest. Denies Chest pain with exertion. Denies Irregular heartbeat. Denies Palpitations. Denies Edema.  Gastrointestinal: Denies Abdominal pain. DeniesDiarrhea. Denies Nausea. Denies Vomiting. Denies Hematemesis or Hematochezia.  Genitourinary: Denies Dysuria. Denies Urinary frequency. Denies Urinary urgency. Denies Blood in urine.  Endocrine: Denies Cold intolerance. Denies Excessive thirst. Denies Heat intolerance. Denies Weight loss. Denies Weight gain.  Musculoskeletal: Denies Painful joints. Denies Weakness.  Integumentary: Denies Rash. Denies Itching. Denies Dry skin.  Neurologic: Denies Dizziness. Denies Fainting. Denies Headache.  Psychiatric: Denies Depression. Denies Anxiety. Denies Suicidal/Homicidal ideations.    All Other ROS: Negative except as stated in HPI.       Objective:     Visit Vitals  /79   Pulse 71   Temp 98.3 °F (36.8 °C) (Oral)   Resp 18   Ht 5' 5" (1.651 m)   Wt (!) 136.5 kg (301 lb)   BMI 50.09 kg/m²       Physical Exam    General: Alert and oriented, No acute distress.  Head: Normocephalic, Atraumatic.  Eye: Pupils are equal, round and reactive to light, Extraocular movements are intact, Sclera non-icteric.  Ears/Nose/Throat: Normal, Mucosa moist,Clear.  Neck/Thyroid: Supple, Non-tender, No carotid bruit, No lymphadenopathy, No JVD, Full range of motion.  Respiratory: Clear to auscultation bilaterally; No wheezes, rales or rhonchi,Non-labored respirations, Symmetrical chest wall expansion.  Cardiovascular: Regular rate and rhythm, S1/S2 normal, No murmurs, rubs or gallops.  Gastrointestinal: Soft, Non-tender, Non-distended, Normal bowel sounds, No palpable organomegaly.  Musculoskeletal: Normal range of motion.  Integumentary: Warm, Dry, Intact, No suspicious lesions or " rashes.  Extremities: No clubbing, cyanosis or edema  Neurologic: No focal deficits, Cranial Nerves II-XII are grossly intact, Motor strength normal upper and lower extremities, Sensory exam intact.  Psychiatric: Normal interaction, Coherent speech, Euthymic mood, Appropriate affect       Labs Reviewed:     Chemistry:  Lab Results   Component Value Date     06/20/2025     09/28/2021    K 3.4 (L) 06/20/2025    K 4.0 09/28/2021    BUN 14.7 06/20/2025    BUN 13 09/28/2021    CREATININE 0.91 06/20/2025    CREATININE 1.31 (H) 09/28/2021    EGFRNORACEVR >60 06/20/2025    CALCIUM 8.8 06/20/2025    CALCIUM 9.2 09/28/2021    ALKPHOS 79 06/20/2025    ALBUMIN 3.2 (L) 06/20/2025    BILIDIR 0.2 07/22/2020    IBILI 0.3 07/22/2020    AST 19 06/20/2025    ALT 28 06/20/2025    MG 2.2 03/17/2019    PHOS 3.9 03/17/2019        Lab Results   Component Value Date    HGBA1C 8.2 (H) 06/20/2025        Hematology:  Lab Results   Component Value Date    WBC 7.44 06/20/2025    HGB 11.7 (L) 06/20/2025    HCT 35.5 (L) 06/20/2025     06/20/2025       Lipid Panel:  Lab Results   Component Value Date    CHOL 146 06/20/2025    HDL 50 06/20/2025    LDL 75.00 06/20/2025    TRIG 106 06/20/2025    TOTALCHOLEST 3 06/20/2025        Urine:  Lab Results   Component Value Date    APPEARANCEUA Clear 06/20/2025    SGUA 1.023 06/20/2025    PROTEINUA Negative 06/20/2025    KETONESUA Negative 06/20/2025    LEUKOCYTESUR Negative 06/20/2025    RBCUA 0-5 06/20/2025    WBCUA 0-5 06/20/2025    BACTERIA None Seen 06/20/2025    SQEPUA Few (A) 06/20/2025    HYALINECASTS None Seen 06/20/2025    CREATRANDUR 38.0 03/18/2019    PROTEINURINE <5.0 03/18/2019        Assessment:       ICD-10-CM ICD-9-CM   1. New onset type 2 diabetes mellitus  E11.9 250.00   2. Primary hypertension  I10 401.9   3. Mixed hyperlipidemia  E78.2 272.2   4. Mild intermittent asthma without complication  J45.20 493.90   5. Anxiety  F41.9 300.00   6. Well adult exam  Z00.00 V70.0    7. Abnormal CBC  R79.89 790.6   8. Obesity, unspecified class, unspecified obesity type, unspecified whether serious comorbidity present  E66.9 278.00   9. Gastroesophageal reflux disease, unspecified whether esophagitis present  K21.9 530.81        Plan:     1. New onset type 2 diabetes mellitus (Primary)  A1c 8.2 up from 5.5. ADA diet and exercise. Refer to DM education for mgmt. Will start on metformin  mg 1 tab po daily with biggest meal. Urine microalbumin with next labs. DM foot exam done today. Will get DM eye exam on next visit.     Protective Sensation (w/ 10 gram monofilament):  Right: Intact  Left: Intact    Visual Inspection:  Normal -  Bilateral    Pedal Pulses:   Right: Present  Left: Present    Posterior Tibialis Pulses:   Right:Present  Left: Present     2. Primary hypertension  BP controlled. Low fat diet and exercise. Cont Hydralazine, Losartan, Metoprolol as prescribed.     3. Mixed hyperlipidemia  Low fat diet and exercise. Cont Atorvastatin as prescribed.     4. Mild intermittent asthma without complication  Avoid triggers. Cont Ventolin HFA, Advair HFA as prescribed.     5. Anxiety  Denies SI/HI. Cont Sertraline as prescribed.     6. Well adult exam  Labs in 4 months. Keep GYN cl appt.     7. Abnormal CBC  CBC stable. CBC in 4 months.    8. Obesity, unspecified class, unspecified obesity type, unspecified whether serious comorbidity present  BMI 50. Low fat diet and exercise. Education provided.      9. GERD  Avoid triggers. RX Famotidine 20 mg 1 tab po BID.     Follow up in about 4 months (around 11/22/2025) for with labs 1 week prior to appt.. In addition to their scheduled follow up, the patient has also been instructed to follow up on as needed basis.     Future Appointments   Date Time Provider Department Center   9/23/2025  1:10 PM Flora Medina FNP [facility] GYN [facility]        RACHEL [place]             [1]   Social History  Socioeconomic History    Marital status:  Single   Tobacco Use    Smoking status: Never     Passive exposure: Never    Smokeless tobacco: Never   Substance and Sexual Activity    Alcohol use: Never    Drug use: Not Currently     Types: Marijuana     Comment: states quit 4-5 months    Sexual activity: Yes     Social Drivers of Health     Financial Resource Strain: Low Risk  (3/16/2025)    Overall Financial Resource Strain (CARDIA)     Difficulty of Paying Living Expenses: Not hard at all   Food Insecurity: No Food Insecurity (3/16/2025)    Hunger Vital Sign     Worried About Running Out of Food in the Last Year: Never true     Ran Out of Food in the Last Year: Never true   Transportation Needs: No Transportation Needs (3/16/2025)    PRAPARE - Transportation     Lack of Transportation (Medical): No     Lack of Transportation (Non-Medical): No   Physical Activity: Inactive (3/16/2025)    Exercise Vital Sign     Days of Exercise per Week: 0 days     Minutes of Exercise per Session: 0 min   Stress: No Stress Concern Present (3/16/2025)    [employer] - Occupational Stress Questionnaire     Feeling of Stress : Not at all   Housing Stability: Low Risk  (3/16/2025)    Housing Stability Vital Sign     Unable to Pay for Housing in the Last Year: No     Number of Times Moved in the Last Year: 0     Homeless in the Last Year: No

## 2025-08-07 ENCOUNTER — PATIENT MESSAGE (OUTPATIENT)
Dept: INTERNAL MEDICINE | Facility: CLINIC | Age: 35
End: 2025-08-07
Payer: MEDICAID

## 2025-08-11 ENCOUNTER — TELEPHONE (OUTPATIENT)
Dept: INTERNAL MEDICINE | Facility: CLINIC | Age: 35
End: 2025-08-11
Payer: MEDICAID

## 2025-08-11 ENCOUNTER — PATIENT MESSAGE (OUTPATIENT)
Dept: INTERNAL MEDICINE | Facility: CLINIC | Age: 35
End: 2025-08-11
Payer: MEDICAID

## 2025-08-11 DIAGNOSIS — E11.9 NEW ONSET TYPE 2 DIABETES MELLITUS: Primary | ICD-10-CM

## 2025-08-11 RX ORDER — GLIPIZIDE 5 MG/1
5 TABLET, FILM COATED, EXTENDED RELEASE ORAL
Qty: 90 TABLET | Refills: 1 | Status: SHIPPED | OUTPATIENT
Start: 2025-08-11 | End: 2026-08-11

## 2025-08-27 ENCOUNTER — PATIENT MESSAGE (OUTPATIENT)
Dept: INTERNAL MEDICINE | Facility: CLINIC | Age: 35
End: 2025-08-27
Payer: MEDICAID

## 2025-08-30 ENCOUNTER — ON-DEMAND VIRTUAL (OUTPATIENT)
Dept: URGENT CARE | Facility: CLINIC | Age: 35
End: 2025-08-30
Payer: MEDICAID